# Patient Record
Sex: MALE | Race: WHITE | NOT HISPANIC OR LATINO | Employment: OTHER | URBAN - NONMETROPOLITAN AREA
[De-identification: names, ages, dates, MRNs, and addresses within clinical notes are randomized per-mention and may not be internally consistent; named-entity substitution may affect disease eponyms.]

---

## 2017-04-14 ENCOUNTER — OFFICE VISIT (OUTPATIENT)
Dept: CARDIOLOGY | Facility: CLINIC | Age: 42
End: 2017-04-14

## 2017-04-14 VITALS
BODY MASS INDEX: 33.04 KG/M2 | SYSTOLIC BLOOD PRESSURE: 140 MMHG | DIASTOLIC BLOOD PRESSURE: 93 MMHG | WEIGHT: 205.6 LBS | OXYGEN SATURATION: 99 % | HEIGHT: 66 IN | HEART RATE: 65 BPM

## 2017-04-14 DIAGNOSIS — E78.5 HYPERLIPIDEMIA, UNSPECIFIED HYPERLIPIDEMIA TYPE: ICD-10-CM

## 2017-04-14 DIAGNOSIS — R07.89 OTHER CHEST PAIN: ICD-10-CM

## 2017-04-14 DIAGNOSIS — I42.9 CARDIOMYOPATHY (HCC): ICD-10-CM

## 2017-04-14 DIAGNOSIS — I10 ESSENTIAL HYPERTENSION: ICD-10-CM

## 2017-04-14 DIAGNOSIS — R07.2 PRECORDIAL PAIN: ICD-10-CM

## 2017-04-14 DIAGNOSIS — I25.119 CORONARY ARTERY DISEASE INVOLVING NATIVE CORONARY ARTERY OF NATIVE HEART WITH ANGINA PECTORIS (HCC): Primary | ICD-10-CM

## 2017-04-14 DIAGNOSIS — R06.02 SHORTNESS OF BREATH: ICD-10-CM

## 2017-04-14 PROBLEM — R07.9 CHEST PAIN: Status: ACTIVE | Noted: 2017-04-14

## 2017-04-14 PROCEDURE — 93000 ELECTROCARDIOGRAM COMPLETE: CPT | Performed by: NURSE PRACTITIONER

## 2017-04-14 PROCEDURE — 99214 OFFICE O/P EST MOD 30 MIN: CPT | Performed by: NURSE PRACTITIONER

## 2017-04-14 RX ORDER — NITROGLYCERIN 0.4 MG/1
TABLET SUBLINGUAL
Qty: 30 TABLET | Refills: 3 | Status: SHIPPED | OUTPATIENT
Start: 2017-04-14

## 2017-04-14 RX ORDER — LISINOPRIL 20 MG/1
20 TABLET ORAL DAILY
COMMUNITY
Start: 2017-04-04 | End: 2019-08-27

## 2017-04-14 NOTE — PATIENT INSTRUCTIONS
"You Can Quit Smoking  If you are ready to quit smoking or are thinking about it, congratulations! You have chosen to help yourself be healthier and live longer! There are lots of different ways to quit smoking. Nicotine gum, nicotine patches, a nicotine inhaler, or nicotine nasal spray can help with physical craving. Hypnosis, support groups, and medicines help break the habit of smoking.  TIPS TO GET OFF AND STAY OFF CIGARETTES  · Learn to predict your moods. Do not let a bad situation be your excuse to have a cigarette. Some situations in your life might tempt you to have a cigarette.  · Ask friends and co-workers not to smoke around you.  · Make your home smoke-free.  · Never have \"just one\" cigarette. It leads to wanting another and another. Remind yourself of your decision to quit.  · On a card, make a list of your reasons for not smoking. Read it at least the same number of times a day as you have a cigarette. Tell yourself everyday, \"I do not want to smoke. I choose not to smoke.\"  · Ask someone at home or work to help you with your plan to quit smoking.  · Have something planned after you eat or have a cup of coffee. Take a walk or get other exercise to perk you up. This will help to keep you from overeating.  · Try a relaxation exercise to calm you down and decrease your stress. Remember, you may be tense and nervous the first two weeks after you quit. This will pass.  · Find new activities to keep your hands busy. Play with a pen, coin, or rubber band. Doodle or draw things on paper.  · Brush your teeth right after eating. This will help cut down the craving for the taste of tobacco after meals. You can try mouthwash too.  · Try gum, breath mints, or diet candy to keep something in your mouth.  IF YOU SMOKE AND WANT TO QUIT:  · Do not stock up on cigarettes. Never buy a carton. Wait until one pack is finished before you buy another.  · Never carry cigarettes with you at work or at home.  · Keep cigarettes " "as far away from you as possible. Leave them with someone else.  · Never carry matches or a lighter with you.  · Ask yourself, \"Do I need this cigarette or is this just a reflex?\"  · Bet with someone that you can quit. Put cigarette money in a [employer] bank every morning. If you smoke, you give up the money. If you do not smoke, by the end of the week, you keep the money.  · Keep trying. It takes 21 days to change a habit!  · Talk to your doctor about using medicines to help you quit. These include nicotine replacement gum, lozenges, or skin patches.     This information is not intended to replace advice given to you by your health care provider. Make sure you discuss any questions you have with your health care provider.     Document Released: 10/14/2010 Document Revised: 03/11/2013 Document Reviewed: 05/03/2016  [employer] Interactive Patient Education ©2016 [employer]  Nonspecific Chest Pain   Chest pain can be caused by many different conditions. There is always a chance that your pain could be related to something serious, such as a heart attack or a blood clot in your lungs. Chest pain can also be caused by conditions that are not life-threatening. If you have chest pain, it is very important to follow up with your health care provider.  CAUSES   Chest pain can be caused by:  · Heartburn.  · Pneumonia or bronchitis.  · Anxiety or stress.  · Inflammation around your heart (pericarditis) or lung (pleuritis or pleurisy).  · A blood clot in your lung.  · A collapsed lung (pneumothorax). It can develop suddenly on its own (spontaneous pneumothorax) or from trauma to the chest.  · Shingles infection (varicella-zoster virus).  · Heart attack.  · Damage to the bones, muscles, and cartilage that make up your chest wall. This can include:    Bruised bones due to injury.    Strained muscles or cartilage due to frequent or repeated coughing or overwork.    Fracture to one or more ribs.    Sore cartilage due to inflammation " (costochondritis).  RISK FACTORS   Risk factors for chest pain may include:  · Activities that increase your risk for trauma or injury to your chest.  · Respiratory infections or conditions that cause frequent coughing.  · Medical conditions or overeating that can cause heartburn.  · Heart disease or family history of heart disease.  · Conditions or health behaviors that increase your risk of developing a blood clot.  · Having had chicken pox (varicella zoster).  SIGNS AND SYMPTOMS  Chest pain can feel like:  · Burning or tingling on the surface of your chest or deep in your chest.  · Crushing, pressure, aching, or squeezing pain.  · Dull or sharp pain that is worse when you move, cough, or take a deep breath.  · Pain that is also felt in your back, neck, shoulder, or arm, or pain that spreads to any of these areas.  Your chest pain may come and go, or it may stay constant.  DIAGNOSIS  Lab tests or other studies may be needed to find the cause of your pain. Your health care provider may have you take a test called an ambulatory ECG (electrocardiogram). An ECG records your heartbeat patterns at the time the test is performed. You may also have other tests, such as:  · Transthoracic echocardiogram (TTE). During echocardiography, sound waves are used to create a picture of all of the heart structures and to look at how blood flows through your heart.  · Transesophageal echocardiogram (CINDY). This is a more advanced imaging test that obtains images from inside your body. It allows your health care provider to see your heart in finer detail.  · Cardiac monitoring. This allows your health care provider to monitor your heart rate and rhythm in real time.  · Holter monitor. This is a portable device that records your heartbeat and can help to diagnose abnormal heartbeats. It allows your health care provider to track your heart activity for several days, if needed.  · Stress tests. These can be done through exercise or by  taking medicine that makes your heart beat more quickly.  · Blood tests.  · Imaging tests.  TREATMENT   Your treatment depends on what is causing your chest pain. Treatment may include:  · Medicines. These may include:    Acid blockers for heartburn.    Anti-inflammatory medicine.    Pain medicine for inflammatory conditions.    Antibiotic medicine, if an infection is present.    Medicines to dissolve blood clots.    Medicines to treat coronary artery disease.  · Supportive care for conditions that do not require medicines. This may include:    Resting.    Applying heat or cold packs to injured areas.    Limiting activities until pain decreases.  HOME CARE INSTRUCTIONS  · If you were prescribed an antibiotic medicine, finish it all even if you start to feel better.  · Avoid any activities that bring on chest pain.  · Do not use any tobacco products, including cigarettes, chewing tobacco, or electronic cigarettes. If you need help quitting, ask your health care provider.  · Do not drink alcohol.  · Take medicines only as directed by your health care provider.  · Keep all follow-up visits as directed by your health care provider. This is important. This includes any further testing if your chest pain does not go away.  · If heartburn is the cause for your chest pain, you may be told to keep your head raised (elevated) while sleeping. This reduces the chance that acid will go from your stomach into your esophagus.  · Make lifestyle changes as directed by your health care provider. These may include:    Getting regular exercise. Ask your health care provider to suggest some activities that are safe for you.    Eating a heart-healthy diet. A registered dietitian can help you to learn healthy eating options.    Maintaining a healthy weight.    Managing diabetes, if necessary.    Reducing stress.  SEEK MEDICAL CARE IF:  · Your chest pain does not go away after treatment.  · You have a rash with blisters on your  chest.  · You have a fever.  SEEK IMMEDIATE MEDICAL CARE IF:   · Your chest pain is worse.  · You have an increasing cough, or you cough up blood.  · You have severe abdominal pain.  · You have severe weakness.  · You faint.  · You have chills.  · You have sudden, unexplained chest discomfort.  · You have sudden, unexplained discomfort in your arms, back, neck, or jaw.  · You have shortness of breath at any time.  · You suddenly start to sweat, or your skin gets clammy.  · You feel nauseous or you vomit.  · You suddenly feel light-headed or dizzy.  · Your heart begins to beat quickly, or it feels like it is skipping beats.  These symptoms may represent a serious problem that is an emergency. Do not wait to see if the symptoms will go away. Get medical help right away. Call your local emergency services (911 in the [place] Do not drive yourself to the hospital.     This information is not intended to replace advice given to you by your health care provider. Make sure you discuss any questions you have with your health care provider.     Document Released: 09/27/2006 Document Revised: 01/08/2016 Document Reviewed: 07/24/2015  [employer] Interactive Patient Education ©2016 [employer]

## 2017-04-14 NOTE — PROGRESS NOTES
Subjective   Nitin Solis is a 41 y.o. male     Chief Complaint   Patient presents with   • Follow-up     6 month cath f/u    • Chest Pain   • Shortness of Breath       HPI    Problem List:    1. CAD  1.1 Stress Test 6/28/16 - low risk  1.2 LHC 10/19/16 - obtuse 70%; first septal  60-80%; first diag 50-70%; EF 35%; Stent to LAD  2. Shortness of breath  2.1 Echo 6/28/16 - mild LVH; EF 45-50%  3. Palpitations  3.1 Event Monitro 11/4-11/10/16 -NSR  4. Dizziness   5. Hypothyroidism   6. Smokeless tobacco and smoking habituation  7. Cardiomyopathy   7.1 MUGA 11/2/16 - EF 44%    Patient is a 41-year-old male who presents today for a follow-up with his daughter at his side. He says he has midsternum/across his chest pressure.  He will get short of breath, nauseated and lightheaded.  He says it goes in between his shoulder blades as well.  He does not take nitro.  He says the Ranexa did not help and he did not tolerate Imdur.  He will have fluttering at times, but not too often.  He denies any dizziness, presyncope, syncope or PND.  He will wake up at times by snoring or trying to get his breath.  He says he very seldomly has BLE edema.  He says he is short of breath all of the time.  He says he has never really felt better.  He still smokes 1 PPD.  He never had his ischemia work-up previously.      Current Outpatient Prescriptions   Medication Sig Dispense Refill   • aspirin 81 MG tablet Take 325 mg by mouth Daily. 30 tablet 11   • atorvastatin (LIPITOR) 40 MG tablet Take 1 tablet by mouth daily. 30 tablet 11   • carvedilol (COREG) 6.25 MG tablet Take 1 tablet by mouth 2 (Two) Times a Day. 180 tablet 3   • clopidogrel (PLAVIX) 75 MG tablet Take 1 tablet by mouth daily. 30 tablet 11   • levothyroxine (SYNTHROID, LEVOTHROID) 25 MCG tablet Take 25 mcg by mouth.     • lisinopril (PRINIVIL,ZESTRIL) 20 MG tablet Daily.     • nitroglycerin (NITROSTAT) 0.4 MG SL tablet 1 under the tongue as needed for angina, may  repeat q5mins for up three doses 30 tablet 3     No current facility-administered medications for this visit.        ALLERGIES    Review of patient's allergies indicates no known allergies.    Past Medical History:   Diagnosis Date   • Gallbladder abscess    • Hypertension    • Hypothyroidism        Social History     Social History   • Marital status:      Spouse name: N/A   • Number of children: N/A   • Years of education: N/A     Occupational History   • Contractor      Social History Main Topics   • Smoking status: Current Every Day Smoker     Packs/day: 0.50     Types: Cigarettes     Start date: 5/10/1991   • Smokeless tobacco: Current User     Types: Chew   • Alcohol use No   • Drug use: No   • Sexual activity: Defer     Other Topics Concern   • Not on file     Social History Narrative       Family History   Problem Relation Age of Onset   • Heart disease Mother    • Valvular heart disease Mother    • Heart attack Father    • Heart failure Paternal Uncle        Review of Systems   Constitutional: Positive for diaphoresis and fatigue. Negative for chills and fever.   HENT: Positive for congestion, rhinorrhea and sneezing.    Eyes: Negative.  Negative for visual disturbance.   Respiratory: Positive for chest tightness and shortness of breath (with CP; all of the time also ). Negative for cough and wheezing.    Cardiovascular: Positive for chest pain (occurs with emotional stress), palpitations (fluttering) and leg swelling (not very often ).   Gastrointestinal: Positive for nausea (with CP). Negative for abdominal pain, blood in stool (no melena, no hematuria, no hematemesis, no hematochezia ), constipation, diarrhea and vomiting.   Endocrine: Negative.  Negative for cold intolerance and heat intolerance.   Genitourinary: Negative for difficulty urinating and frequency.   Musculoskeletal: Positive for arthralgias, back pain (between shoulder blades with CP ), myalgias and neck pain.   Skin: Negative.   "  Allergic/Immunologic: Positive for environmental allergies.   Neurological: Positive for light-headedness (with CP ). Negative for dizziness, tremors, seizures, syncope, facial asymmetry, speech difficulty, weakness, numbness and headaches.   Hematological: Negative.    Psychiatric/Behavioral: Negative.        Objective   /93 (BP Location: Left arm, Patient Position: Sitting)  Pulse 65  Ht 66\" (167.6 cm)  Wt 205 lb 9.6 oz (93.3 kg)  SpO2 99%  BMI 33.18 kg/m2  Lab Results (most recent)     None        Physical Exam   Constitutional: He is oriented to person, place, and time. Vital signs are normal. He appears well-developed and well-nourished. He is active and cooperative.   HENT:   Head: Normocephalic.   Mouth/Throat: Abnormal dentition (missing teeth ).   Eyes: Lids are normal.   Neck: Normal carotid pulses, no hepatojugular reflux and no JVD present. Carotid bruit is not present.   Cardiovascular: Normal rate and regular rhythm.    Murmur heard.   Systolic (LUSB) murmur is present with a grade of 1/6   Pulses:       Radial pulses are 2+ on the right side, and 2+ on the left side.        Dorsalis pedis pulses are 2+ on the right side, and 2+ on the left side.        Posterior tibial pulses are 2+ on the right side, and 2+ on the left side.   No edema BLE.    Pulmonary/Chest: Effort normal and breath sounds normal.   Abdominal: Normal appearance.   Neurological: He is alert and oriented to person, place, and time.   Skin: Skin is warm, dry and intact.   Psychiatric: His speech is normal and behavior is normal. Judgment and thought content normal. Cognition and memory are normal.   Flat affect          ECG 12 Lead  Date/Time: 4/14/2017 9:40 AM  Performed by: QUIANA JAIME  Authorized by: QUIANA JAIME   Comparison: compared with previous ECG from 10/19/2016  Similar to previous ECG  Rhythm: sinus rhythm  Rate: normal  BPM: 60  T wave flattening noted on lead: nonspecific t wave changes   QRS axis: " normal  Clinical impression: non-specific ECG          Assessment/Plan      Diagnosis Plan   1. Coronary artery disease involving native coronary artery of native heart with angina pectoris  ECG 12 Lead    Stress Test With Myocardial Perfusion One Day    Adult Transthoracic Echo Complete    Stress Test With Myocardial Perfusion One Day    Adult Transthoracic Echo Complete   2. Shortness of breath  ECG 12 Lead    Stress Test With Myocardial Perfusion One Day    Adult Transthoracic Echo Complete    Pulse Oximetry Device    Stress Test With Myocardial Perfusion One Day    Adult Transthoracic Echo Complete   3. Other chest pain  ECG 12 Lead    Stress Test With Myocardial Perfusion One Day    Adult Transthoracic Echo Complete    Stress Test With Myocardial Perfusion One Day    Adult Transthoracic Echo Complete   4. Hyperlipidemia, unspecified hyperlipidemia type  Stress Test With Myocardial Perfusion One Day    Adult Transthoracic Echo Complete    Stress Test With Myocardial Perfusion One Day    Adult Transthoracic Echo Complete   5. Essential hypertension  Stress Test With Myocardial Perfusion One Day    Adult Transthoracic Echo Complete    Stress Test With Myocardial Perfusion One Day    Adult Transthoracic Echo Complete   6. Precordial pain  nitroglycerin (NITROSTAT) 0.4 MG SL tablet    Stress Test With Myocardial Perfusion One Day    Adult Transthoracic Echo Complete    Stress Test With Myocardial Perfusion One Day    Adult Transthoracic Echo Complete   7. Cardiomyopathy         Return for After testing.         Patient will have an ischemia work-up, stress and echo.  He will continue his medication regimen.  He will use Nitro PRN for chest pain, no resolution he will go to the ER.  He did not tolerate Imdur and Ranexa did not help his symptoms.  He will wear an overnight pulse ox.  He will follow-up after testing or sooner if any changes.  He was encouraged on smoking cessation.

## 2017-04-24 ENCOUNTER — APPOINTMENT (OUTPATIENT)
Dept: CARDIOLOGY | Facility: HOSPITAL | Age: 42
End: 2017-04-24

## 2017-04-24 ENCOUNTER — HOSPITAL ENCOUNTER (OUTPATIENT)
Dept: CARDIOLOGY | Facility: HOSPITAL | Age: 42
Discharge: HOME | End: 2017-04-24

## 2017-04-24 ENCOUNTER — OUTSIDE FACILITY SERVICE (OUTPATIENT)
Dept: CARDIOLOGY | Facility: CLINIC | Age: 42
End: 2017-04-24

## 2017-04-24 ENCOUNTER — HOSPITAL ENCOUNTER (OUTPATIENT)
Dept: CARDIOLOGY | Facility: HOSPITAL | Age: 42
Discharge: HOME | End: 2017-04-24
Admitting: NURSE PRACTITIONER

## 2017-04-24 PROCEDURE — 93306 TTE W/DOPPLER COMPLETE: CPT

## 2017-04-24 PROCEDURE — 93306 TTE W/DOPPLER COMPLETE: CPT | Performed by: INTERNAL MEDICINE

## 2017-05-17 ENCOUNTER — TELEPHONE (OUTPATIENT)
Dept: CARDIOLOGY | Facility: CLINIC | Age: 42
End: 2017-05-17

## 2017-05-18 ENCOUNTER — TELEPHONE (OUTPATIENT)
Dept: CARDIOLOGY | Facility: CLINIC | Age: 42
End: 2017-05-18

## 2017-05-18 DIAGNOSIS — R79.81 ABNORMAL PULSE OXIMETRY: Primary | ICD-10-CM

## 2017-05-18 DIAGNOSIS — R06.02 SHORTNESS OF BREATH: ICD-10-CM

## 2017-07-24 ENCOUNTER — OFFICE VISIT (OUTPATIENT)
Dept: CARDIOLOGY | Facility: CLINIC | Age: 42
End: 2017-07-24

## 2017-07-24 VITALS
HEART RATE: 73 BPM | WEIGHT: 209.4 LBS | SYSTOLIC BLOOD PRESSURE: 114 MMHG | OXYGEN SATURATION: 97 % | BODY MASS INDEX: 33.65 KG/M2 | DIASTOLIC BLOOD PRESSURE: 71 MMHG | HEIGHT: 66 IN

## 2017-07-24 DIAGNOSIS — I10 ESSENTIAL HYPERTENSION: ICD-10-CM

## 2017-07-24 DIAGNOSIS — R06.02 SHORTNESS OF BREATH: ICD-10-CM

## 2017-07-24 DIAGNOSIS — I25.119 CORONARY ARTERY DISEASE INVOLVING NATIVE CORONARY ARTERY OF NATIVE HEART WITH ANGINA PECTORIS (HCC): Primary | ICD-10-CM

## 2017-07-24 DIAGNOSIS — F17.200 SMOKING: ICD-10-CM

## 2017-07-24 DIAGNOSIS — E78.5 HYPERLIPIDEMIA, UNSPECIFIED HYPERLIPIDEMIA TYPE: ICD-10-CM

## 2017-07-24 DIAGNOSIS — Z72.0 SMOKELESS TOBACCO USE: ICD-10-CM

## 2017-07-24 PROCEDURE — 99213 OFFICE O/P EST LOW 20 MIN: CPT | Performed by: NURSE PRACTITIONER

## 2017-07-24 NOTE — PROGRESS NOTES
Subjective   Nitin Solis is a 41 y.o. male     Chief Complaint   Patient presents with   • Follow-up     presents as a follow up       HPI    Problem List:    1. CAD  1.1 Stress Test 6/28/16 - low risk  1.2 LHC 10/19/16 - obtuse 70%; first septal  60-80%; first diag 50-70%; EF 35%; Stent to LAD  2. Shortness of breath  2.1 Echo 6/28/16 - mild LVH; EF 45-50%  2.2 Echo 4/24/17-mild LVH, EF 50-55%, diastolic dysfunction 1, trace MR, trace TR  3. Palpitations  3.1 Event Monitro 11/4-11/10/16 -NSR  4. Dizziness   5. Hypothyroidism   6. Smokeless tobacco and smoking habituation  7. Cardiomyopathy   7.1 MUGA 11/2/16 - EF 44%    Patient is a 41-year-old male who presents today for follow-up.  He denies any chest pain, pressure, palpitations, fluttering, dizziness, presyncope, 6B, orthopnea, PND or edema.  He also denies any shortness of breath.  He goes and sees Dr. Smith today for follow-up status post sleep study and PFT.  He is still smoking half pack a day and uses a half can of the per day.  Patient asked what medications he can.  Advise not this time as of the end of October he will be up to come off Plavix.  PCP monitors his cholesterol.    Current Outpatient Prescriptions   Medication Sig Dispense Refill   • aspirin 81 MG tablet Take 325 mg by mouth Daily. 30 tablet 11   • atorvastatin (LIPITOR) 40 MG tablet Take 1 tablet by mouth daily. 30 tablet 11   • carvedilol (COREG) 6.25 MG tablet Take 1 tablet by mouth 2 (Two) Times a Day. 180 tablet 3   • clopidogrel (PLAVIX) 75 MG tablet Take 1 tablet by mouth daily. 30 tablet 11   • levothyroxine (SYNTHROID, LEVOTHROID) 25 MCG tablet Take 25 mcg by mouth.     • lisinopril (PRINIVIL,ZESTRIL) 20 MG tablet Daily.     • nitroglycerin (NITROSTAT) 0.4 MG SL tablet 1 under the tongue as needed for angina, may repeat q5mins for up three doses 30 tablet 3     No current facility-administered medications for this visit.        ALLERGIES    Review of patient's  "allergies indicates no known allergies.    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   HENT: Negative for rhinorrhea and sneezing.    Eyes: Negative for visual disturbance.   Respiratory: Negative for chest tightness and shortness of breath.    Cardiovascular: Negative for chest pain, palpitations and leg swelling.   Gastrointestinal: Negative for nausea and vomiting.   Endocrine: Negative.    Genitourinary: Negative for difficulty urinating.   Musculoskeletal: Negative for arthralgias, back pain and neck pain.   Skin: Negative.    Allergic/Immunologic: Negative.  Negative for environmental allergies.   Neurological: Negative for dizziness, syncope and light-headedness.   Hematological: Negative.    Psychiatric/Behavioral: Negative.        Objective   /71 (BP Location: Left arm, Patient Position: Sitting)  Pulse 73  Ht 66\" (167.6 cm)  Wt 209 lb 6.4 oz (95 kg)  SpO2 97%  BMI 33.8 kg/m2  Lab Results (most recent)     None        Physical Exam   Constitutional: He is oriented to person, place, and time. Vital signs are normal. He appears well-developed and well-nourished. He is active and " cooperative.   HENT:   Head: Normocephalic.   Eyes: Lids are normal.   Neck: Normal carotid pulses, no hepatojugular reflux and no JVD present. Carotid bruit is not present.   Cardiovascular: Normal rate, regular rhythm and normal heart sounds.    Pulses:       Radial pulses are 2+ on the right side, and 2+ on the left side.        Dorsalis pedis pulses are 2+ on the left side.        Posterior tibial pulses are 2+ on the left side.   No edema BLE; unable to assess right due to fx with boot   Pulmonary/Chest: Effort normal and breath sounds normal.   Abdominal: Normal appearance and bowel sounds are normal.   Musculoskeletal:   fx right foot with boot    Neurological: He is alert and oriented to person, place, and time.   Skin: Skin is warm, dry and intact.   Psychiatric: His speech is normal and behavior is normal. Judgment and thought content normal. Cognition and memory are normal.   Flat        Procedure   Procedures         Assessment/Plan      Diagnosis Plan   1. Coronary artery disease involving native coronary artery of native heart with angina pectoris     2. Essential hypertension     3. Hyperlipidemia, unspecified hyperlipidemia type     4. Shortness of breath     5. Smoking     6. Smokeless tobacco use         Return in about 6 months (around 1/24/2018).       CAD-patient is on aspirin, statin and beta.  Hypertension-patient doing well.  Dyslipidemia-patient is on Lipitor and monitor by PCP.  Shortness of breath-patient says this is improved.  Smoking-patient was encouraged on smoking cessation as well as vocalist tobacco cessation.  Patient will follow-up in 6 months or sooner if any changes.  Patient refused Lexiscan however no symptoms at this time.

## 2017-07-24 NOTE — PATIENT INSTRUCTIONS
Coronary Artery Disease, Male  Coronary artery disease (CAD) is a process in which the blood vessels of the heart (coronary arteries) become narrow or blocked. The narrowing or blockage can lead to decreased blood flow to the heart muscle (angina). Prolonged reduced blood flow can cause a heart attack (myocardial infarction, MI). Because CAD is the leading cause of death in men, it is important to understand what causes this condition and how it is treated.  CAUSES  Atherosclerosis is the cause of CAD. This is the buildup of fat and cholesterol (plaque) on the inside of the arteries. Over time, the plaque may narrow or block the artery, and this will lessen blood flow to the heart. Plaque can also become weak and break off within a coronary artery to form a clot and cause a sudden blockage.  RISK FACTORS  Many risk factors increase your chances of getting CAD, including:  · High cholesterol levels.  · High blood pressure (hypertension).  · Tobacco use.  · Diabetes.  · Age. Men over age 45 are at a greater risk of CAD.  · Gender. Men often develop CAD earlier in life than women.  · Family history of CAD.  · Obesity.  · Lack of exercise.  · A diet high in saturated fats.  SYMPTOMS   Many people do not experience any symptoms during the early stages of CAD. As the condition progresses, symptoms may include:   · Chest pain.  ¨ The pain can be described as a crushing or squeezing in the chest, or a tightness, pressure, fullness, or heaviness in the chest.  ¨ The pain can last more than a few minutes or can stop and recur.   · Pain in the arms, neck, jaw, or back.  · Unexplained heartburn or indigestion.  · Shortness of breath.  · Nausea.  · Sudden cold sweats.   Less common symptoms of CAD in men can include:  · Fatigue.  · Unexplained feelings of nervousness or anxiety.  · Weakness.  · Diarrhea.  · Sudden light-headedness.  DIAGNOSIS   Tests to diagnose CAD may include:  · ECG (electrocardiogram).  · Exercise stress  test. This looks for signs of blockage when the heart is being exercised.  · Pharmacologic stress test. This test looks for signs of blockage when the heart is being stressed with a medicine.  · Blood tests.  · Coronary angiogram. This is a procedure to look at the coronary arteries to see if there is any blockage.  TREATMENT  The treatment of CAD may include the following:  · Healthy behavioral changes to reduce or control risk factors.  · Medicine.  · Coronary stenting. A stent helps to keep an artery open.  · Coronary angioplasty. This procedure widens a narrowed or blocked artery.  · Coronary artery bypass surgery. This will allow your blood to pass the blockage (bypass) to reach your heart.  HOME CARE INSTRUCTIONS  · Take medicines only as directed by your health care provider.  · Do not take the following medicines unless your health care provider approves:    Nonsteroidal anti-inflammatory drugs (NSAIDs), such as ibuprofen, naproxen, or celecoxib.    Vitamin supplements that contain vitamin A, vitamin E, or both.  · Manage other health conditions such as hypertension and diabetes as directed by your health care provider.  · Follow a heart-healthy diet. A dietitian can help to educate you about healthy food options and changes.  · Use healthy cooking methods such as roasting, grilling, broiling, baking, poaching, steaming, or stir-frying. Talk to a dietitian to learn more about healthy cooking methods.  · Follow an exercise program approved by your health care provider.  · Maintain a healthy weight. Lose weight as approved by your health care provider.  · Plan rest periods when fatigued.  · Learn to manage stress.  · Do not use any tobacco products, including cigarettes, chewing tobacco, or electronic cigarettes. If you need help quitting, ask your health care provider.  · If you drink alcohol, and your health care provider approves, limit your alcohol intake to no more than 1 drink per day. One drink equals  12 ounces of beer, 5 ounces of wine, or 1½ ounces of hard liquor.  · Stop illegal drug use.  · Your health care provider may ask you to monitor your blood pressure. A blood pressure reading consists of a higher number over a lower number, such as 110 over 72, which is written as 110/72. Ideally, your blood pressure should be:    Below 140/90 if you have no other medical conditions.    Below 130/80 if you have diabetes or kidney disease.  · Keep all follow-up visits as directed by your health care provider. This is important.  SEEK IMMEDIATE MEDICAL CARE IF:  · You have pain in your chest, neck, arm, jaw, stomach, or back that lasts more than a few minutes, is recurring, or is unrelieved by taking medicine under your tongue (sublingual nitroglycerin).  · You have profuse sweating without cause.  · You have unexplained:    Heartburn or indigestion.    Shortness of breath or difficulty breathing.    Nausea or vomiting.    Fatigue.    Feelings of nervousness or anxiety.    Weakness.    Diarrhea.  · You have sudden light-headedness or dizziness.  · You faint.  These symptoms may represent a serious problem that is an emergency. Do not wait to see if the symptoms will go away. Get medical help right away. Call your local emergency services (911 in the [place] Do not drive yourself to the hospital.     This information is not intended to replace advice given to you by your health care provider. Make sure you discuss any questions you have with your health care provider.     Document Released: 07/15/2015 Document Reviewed: 07/15/2015  [employer] Interactive Patient Education ©2017 [employer]  What You Need to Know About Smokeless Tobacco Use  Tobacco use is one of the leading causes of cancer and other chronic health problems. Smokeless tobacco is tobacco that is put directly into the mouth instead of being smoked. It may also be called chewing tobacco or snuff. Smokeless tobacco is made from the leaves of tobacco plants  "and it comes in several forms:  · Loose, dry leaves, plugs, or twists.  · Moist pouches.  · Dissolving lozenges or strips.  Chewing, sucking, or holding the tobacco in your mouth causes your mouth to make more saliva. The saliva mixes with the tobacco to make \"tobacco juice\" that is swallowed or spit out.  HOW CAN SMOKELESS TOBACCO AFFECT ME?  Using smokeless tobacco:  · Increases your risk of developing cancer. Smokeless tobacco contains at least 28 different types of cancer-causing chemicals (carcinogens).  · Increases your chances of developing other long-term health problems, including high blood pressure, heart disease, stroke, and dental problems.  · Can make you become addicted. Nicotine is one of the chemicals in tobacco. When you chew tobacco, you absorb nicotine from the tobacco juice. This can make you feel more alert than usual.  · Can cause problems with pregnancy. Pregnant women who use smokeless tobacco are more likely to miscarry or deliver a baby too early (premature delivery).  · Can affect the appearance and health of your mouth. Using smokeless tobacco may cause bad breath, yellow-brown teeth, mouth sores, cracking and bleeding lips, gum recession, and lesions on the soft tissues of your mouth (leukoplakia).  WHAT ARE THE BENEFITS OF NOT USING SMOKELESS TOBACCO?  The benefits of not using smokeless tobacco include:  · A healthy mind because:    You avoid addiction.  · A healthy body because:    You avoid dental problems.    You promote healthy pregnancy.    You avoid long-term health problems.  · A healthy wallet because:    You avoid costs of buying tobacco.    You avoid health care costs in the future.  · A healthy family because:    You avoid accidental poisoning of children in your household.  WHAT CAN HAPPEN IF I CONTINUE TO USE SMOKELESS TOBACCO?  If you continue to use smokeless tobacco, you will increase your risk for developing certain cancers. These include:  · Tongue.  · Lips, mouth, " and gums.  · Throat (esophagus) and voice box (larynx).  · Stomach.  · Pancreas.  · Bladder.  · Colon.  Long-term use of smokeless tobacco can also lead to:  · High blood pressure, heart disease, and stroke.  · Gum disease, gum recession, and bone loss around the teeth.  · Tooth decay.  HOW DO I QUIT USING SMOKELESS TOBACCO?  Quitting the use of smokeless tobacco can be hard, but it can be done. Follow these steps:  · Pick a date to quit. Set a date within the next two weeks. This gives you time to prepare.  · Write down the reasons why you are quitting. Keep this list in places where you will see it often, such as on your bathroom mirror or in your car or wallet.  · Identify the people, places, things, and activities that make you want to use tobacco (triggers) and avoid them.  · Get rid of any tobacco you have and remove any tobacco smells. To do this:    Throw away all containers of tobacco at home, at work, and in your car.    Throw away any other items that you use regularly when you chew tobacco.    Clean your car and make sure to remove all tobacco-related items.    Clean your home, including curtains and carpets.  · Tell your family, friends, and coworkers that you are quitting. This can make quitting easier.  · Ask your health care provider for help quitting smokeless tobacco. This may involve treatment. Find out what treatment options are covered by your health insurance.  · Keep track of how many days have passed since you quit. Remembering how long and hard you have worked to quit can help you avoid using tobacco again.  WHERE CAN I GET SUPPORT?  Ask your health care provider if there is a local support group for quitting smokeless tobacco.  WHERE CAN I GET MORE INFORMATION?  You can learn more about the risks of using smokeless tobacco and the benefits of quitting from these sources:  · National Cancer [place]: www.cancer.gov  · American Cancer Society: www.cancer.org  WHEN SHOULD I SEEK MEDICAL  "CARE?  Seek medical care if you have:  · White or other discolored patches in your mouth.  · Difficulty swallowing.  · A change in your voice.  · Unexplained weight loss.  · Stomach pain, nausea, or vomiting.  REMEMBER:  · Smokeless tobacco contains at least 28 different chemicals that are known to cause cancer (carcinogen).  · Nicotine is an addictive chemical in smokeless tobacco.  · When you quit using smokeless tobacco, you lower your risk of developing cancer.     This information is not intended to replace advice given to you by your health care provider. Make sure you discuss any questions you have with your health care provider.     Document Released: 05/21/2012 Document Revised: 04/10/2017 Document Reviewed: 07/29/2016  [employer] Interactive Patient Education ©2017 [employer]  You Can Quit Smoking  If you are ready to quit smoking or are thinking about it, congratulations! You have chosen to help yourself be healthier and live longer! There are lots of different ways to quit smoking. Nicotine gum, nicotine patches, a nicotine inhaler, or nicotine nasal spray can help with physical craving. Hypnosis, support groups, and medicines help break the habit of smoking.  TIPS TO GET OFF AND STAY OFF CIGARETTES  · Learn to predict your moods. Do not let a bad situation be your excuse to have a cigarette. Some situations in your life might tempt you to have a cigarette.  · Ask friends and co-workers not to smoke around you.  · Make your home smoke-free.  · Never have \"just one\" cigarette. It leads to wanting another and another. Remind yourself of your decision to quit.  · On a card, make a list of your reasons for not smoking. Read it at least the same number of times a day as you have a cigarette. Tell yourself everyday, \"I do not want to smoke. I choose not to smoke.\"  · Ask someone at home or work to help you with your plan to quit smoking.  · Have something planned after you eat or have a cup of coffee. Take a " "walk or get other exercise to perk you up. This will help to keep you from overeating.  · Try a relaxation exercise to calm you down and decrease your stress. Remember, you may be tense and nervous the first two weeks after you quit. This will pass.  · Find new activities to keep your hands busy. Play with a pen, coin, or rubber band. Doodle or draw things on paper.  · Brush your teeth right after eating. This will help cut down the craving for the taste of tobacco after meals. You can try mouthwash too.  · Try gum, breath mints, or diet candy to keep something in your mouth.  IF YOU SMOKE AND WANT TO QUIT:  · Do not stock up on cigarettes. Never buy a carton. Wait until one pack is finished before you buy another.  · Never carry cigarettes with you at work or at home.  · Keep cigarettes as far away from you as possible. Leave them with someone else.  · Never carry matches or a lighter with you.  · Ask yourself, \"Do I need this cigarette or is this just a reflex?\"  · Bet with someone that you can quit. Put cigarette money in a [employer] bank every morning. If you smoke, you give up the money. If you do not smoke, by the end of the week, you keep the money.  · Keep trying. It takes 21 days to change a habit!  · Talk to your doctor about using medicines to help you quit. These include nicotine replacement gum, lozenges, or skin patches.     This information is not intended to replace advice given to you by your health care provider. Make sure you discuss any questions you have with your health care provider.     Document Released: 10/14/2010 Document Revised: 03/11/2013 Document Reviewed: 05/03/2016  [employer] Interactive Patient Education ©2017 [employer]    "

## 2017-09-13 DIAGNOSIS — R06.83 SNORING: ICD-10-CM

## 2017-09-13 DIAGNOSIS — E78.5 DYSLIPIDEMIA: ICD-10-CM

## 2017-09-13 DIAGNOSIS — R53.83 MALAISE AND FATIGUE: ICD-10-CM

## 2017-09-13 DIAGNOSIS — R06.02 SHORTNESS OF BREATH: ICD-10-CM

## 2017-09-13 DIAGNOSIS — I10 ESSENTIAL HYPERTENSION: ICD-10-CM

## 2017-09-13 DIAGNOSIS — R07.2 PRECORDIAL PAIN: ICD-10-CM

## 2017-09-13 DIAGNOSIS — R53.81 MALAISE AND FATIGUE: ICD-10-CM

## 2017-09-13 RX ORDER — ATORVASTATIN CALCIUM 40 MG/1
TABLET, FILM COATED ORAL
Qty: 30 TABLET | Refills: 5 | Status: SHIPPED | OUTPATIENT
Start: 2017-09-13

## 2017-09-13 RX ORDER — CLOPIDOGREL BISULFATE 75 MG/1
TABLET ORAL
Qty: 30 TABLET | Refills: 5 | Status: SHIPPED | OUTPATIENT
Start: 2017-09-13

## 2017-12-02 DIAGNOSIS — I42.9 CARDIOMYOPATHY (HCC): ICD-10-CM

## 2017-12-04 RX ORDER — CARVEDILOL 6.25 MG/1
TABLET ORAL
Qty: 180 TABLET | Refills: 5 | Status: SHIPPED | OUTPATIENT
Start: 2017-12-04 | End: 2019-08-06 | Stop reason: SDUPTHER

## 2018-03-27 ENCOUNTER — OFFICE VISIT (OUTPATIENT)
Dept: CARDIOLOGY | Facility: CLINIC | Age: 43
End: 2018-03-27

## 2018-03-27 VITALS
BODY MASS INDEX: 32.75 KG/M2 | DIASTOLIC BLOOD PRESSURE: 87 MMHG | SYSTOLIC BLOOD PRESSURE: 122 MMHG | OXYGEN SATURATION: 97 % | WEIGHT: 203.8 LBS | HEIGHT: 66 IN | HEART RATE: 91 BPM

## 2018-03-27 DIAGNOSIS — F17.200 SMOKING: ICD-10-CM

## 2018-03-27 DIAGNOSIS — R07.89 OTHER CHEST PAIN: ICD-10-CM

## 2018-03-27 DIAGNOSIS — Z72.0 SMOKELESS TOBACCO USE: ICD-10-CM

## 2018-03-27 DIAGNOSIS — E78.5 HYPERLIPIDEMIA, UNSPECIFIED HYPERLIPIDEMIA TYPE: ICD-10-CM

## 2018-03-27 DIAGNOSIS — I10 ESSENTIAL HYPERTENSION: ICD-10-CM

## 2018-03-27 DIAGNOSIS — R06.02 SHORTNESS OF BREATH: ICD-10-CM

## 2018-03-27 DIAGNOSIS — Z00.00 HEALTHCARE MAINTENANCE: ICD-10-CM

## 2018-03-27 DIAGNOSIS — I25.119 CORONARY ARTERY DISEASE INVOLVING NATIVE CORONARY ARTERY OF NATIVE HEART WITH ANGINA PECTORIS (HCC): Primary | ICD-10-CM

## 2018-03-27 PROCEDURE — 93000 ELECTROCARDIOGRAM COMPLETE: CPT | Performed by: NURSE PRACTITIONER

## 2018-03-27 PROCEDURE — 99214 OFFICE O/P EST MOD 30 MIN: CPT | Performed by: NURSE PRACTITIONER

## 2018-03-27 RX ORDER — ISOSORBIDE MONONITRATE 30 MG/1
TABLET, EXTENDED RELEASE ORAL
Qty: 30 TABLET | Refills: 11 | Status: SHIPPED | OUTPATIENT
Start: 2018-03-27 | End: 2019-08-06 | Stop reason: SINTOL

## 2018-03-27 NOTE — PATIENT INSTRUCTIONS
Obesity, Adult  Obesity is the condition of having too much total body fat. Being overweight or obese means that your weight is greater than what is considered healthy for your body size. Obesity is determined by a measurement called BMI. BMI is an estimate of body fat and is calculated from height and weight. For adults, a BMI of 30 or higher is considered obese.  Obesity can eventually lead to other health concerns and major illnesses, including:  · Stroke.  · Coronary artery disease (CAD).  · Type 2 diabetes.  · Some types of cancer, including cancers of the colon, breast, uterus, and gallbladder.  · Osteoarthritis.  · High blood pressure (hypertension).  · High cholesterol.  · Sleep apnea.  · Gallbladder stones.  · Infertility problems.  What are the causes?  The main cause of obesity is taking in (consuming) more calories than your body uses for energy. Other factors that contribute to this condition may include:  · Being born with genes that make you more likely to become obese.  · Having a medical condition that causes obesity. These conditions include:  ¨ Hypothyroidism.  ¨ Polycystic ovarian syndrome (PCOS).  ¨ Binge-eating disorder.  ¨ Cushing syndrome.  · Taking certain medicines, such as steroids, antidepressants, and seizure medicines.  · Not being physically active (sedentary lifestyle).  · Living where there are limited places to exercise safely or buy healthy foods.  · Not getting enough sleep.  What increases the risk?  The following factors may increase your risk of this condition:  · Having a family history of obesity.  · Being a woman of -American descent.  · Being a man of  descent.  What are the signs or symptoms?  Having excessive body fat is the main symptom of this condition.  How is this diagnosed?  This condition may be diagnosed based on:  · Your symptoms.  · Your medical history.  · A physical exam. Your health care provider may measure:  ¨ Your BMI. If you are an adult  with a BMI between 25 and less than 30, you are considered overweight. If you are an adult with a BMI of 30 or higher, you are considered obese.  ¨ The distances around your hips and your waist (circumferences). These may be compared to each other to help diagnose your condition.  ¨ Your skinfold thickness. Your health care provider may gently pinch a fold of your skin and measure it.  How is this treated?  Treatment for this condition often includes changing your lifestyle. Treatment may include some or all of the following:  · Dietary changes. Work with your health care provider and a dietitian to set a weight-loss goal that is healthy and reasonable for you. Dietary changes may include eating:  ¨ Smaller portions. A portion size is the amount of a particular food that is healthy for you to eat at one time. This varies from person to person.  ¨ Low-calorie or low-fat options.  ¨ More whole grains, fruits, and vegetables.  · Regular physical activity. This may include aerobic activity (cardio) and strength training.  · Medicine to help you lose weight. Your health care provider may prescribe medicine if you are unable to lose 1 pound a week after 6 weeks of eating more healthily and doing more physical activity.  · Surgery. Surgical options may include gastric banding and gastric bypass. Surgery may be done if:  ¨ Other treatments have not helped to improve your condition.  ¨ You have a BMI of 40 or higher.  ¨ You have life-threatening health problems related to obesity.  Follow these instructions at home:     Eating and drinking     · Follow recommendations from your health care provider about what you eat and drink. Your health care provider may advise you to:  ¨ Limit fast foods, sweets, and processed snack foods.  ¨ Choose low-fat options, such as low-fat milk instead of whole milk.  ¨ Eat 5 or more servings of fruits or vegetables every day.  ¨ Eat at home more often. This gives you more control over what you  eat.  ¨ Choose healthy foods when you eat out.  ¨ Learn what a healthy portion size is.  ¨ Keep low-fat snacks on hand.  ¨ Avoid sugary drinks, such as soda, fruit juice, iced tea sweetened with sugar, and flavored milk.  ¨ Eat a healthy breakfast.  · Drink enough water to keep your urine clear or pale yellow.  · Do not go without eating for long periods of time (do not fast) or follow a fad diet. Fasting and fad diets can be unhealthy and even dangerous.  Physical Activity   · Exercise regularly, as told by your health care provider. Ask your health care provider what types of exercise are safe for you and how often you should exercise.  · Warm up and stretch before being active.  · Cool down and stretch after being active.  · Rest between periods of activity.  Lifestyle   · Limit the time that you spend in front of your TV, computer, or video game system.  · Find ways to reward yourself that do not involve food.  · Limit alcohol intake to no more than 1 drink a day for nonpregnant women and 2 drinks a day for men. One drink equals 12 oz of beer, 5 oz of wine, or 1½ oz of hard liquor.  General instructions   · Keep a weight loss journal to keep track of the food you eat and how much you exercise you get.  · Take over-the-counter and prescription medicines only as told by your health care provider.  · Take vitamins and supplements only as told by your health care provider.  · Consider joining a support group. Your health care provider may be able to recommend a support group.  · Keep all follow-up visits as told by your health care provider. This is important.  Contact a health care provider if:  · You are unable to meet your weight loss goal after 6 weeks of dietary and lifestyle changes.  This information is not intended to replace advice given to you by your health care provider. Make sure you discuss any questions you have with your health care provider.  Document Released: 01/25/2006 Document Revised:  05/22/2017 Document Reviewed: 10/05/2016  [employer] Interactive Patient Education © 2017 [employer]  MyPlate from [employer]  The general, healthful diet is based on the 2010 Dietary Guidelines for Americans. The amount of food you need to eat from each food group depends on your age, sex, and level of physical activity and can be individualized by a dietitian. Go to ChooseMyPlate.gov for more information.  What do I need to know about the MyPlate plan?  · Enjoy your food, but eat less.  · Avoid oversized portions.  ¨ ½ of your plate should include fruits and vegetables.  ¨ ¼ of your plate should be grains.  ¨ ¼ of your plate should be protein.  Grains   · Make at least half of your grains whole grains.  · For a 2,000 calorie daily food plan, eat 6 oz every day.  · 1 oz is about 1 slice bread, 1 cup cereal, or ½ cup cooked rice, cereal, or pasta.  Vegetables   · Make half your plate fruits and vegetables.  · For a 2,000 calorie daily food plan, eat 2½ cups every day.  · 1 cup is about 1 cup raw or cooked vegetables or vegetable juice or 2 cups raw leafy greens.  Fruits   · Make half your plate fruits and vegetables.  · For a 2,000 calorie daily food plan, eat 2 cups every day.  · 1 cup is about 1 cup fruit or 100% fruit juice or ½ cup dried fruit.  Protein   · For a 2,000 calorie daily food plan, eat 5½ oz every day.  · 1 oz is about 1 oz meat, poultry, or fish, ¼ cup cooked beans, 1 egg, 1 Tbsp peanut butter, or ½ oz nuts or seeds.  Dairy   · Switch to fat-free or low-fat (1%) milk.  · For a 2,000 calorie daily food plan, eat 3 cups every day.  · 1 cup is about 1 cup milk or yogurt or soy milk (soy beverage), 1½ oz natural cheese, or 2 oz processed cheese.  Fats, Oils, and Empty Calories   · Only small amounts of oils are recommended.  · Empty calories are calories from solid fats or added sugars.  · Compare sodium in foods like soup, bread, and frozen meals. Choose the foods with lower numbers.  · Drink water instead  of sugary drinks.  What foods can I eat?  Grains   Whole grains such as whole wheat, quinoa, millet, and bulgur. Bread, rolls, and pasta made from whole grains. Brown or wild rice. Hot or cold cereals made from whole grains and without added sugar.  Vegetables   All fresh vegetables, especially fresh red, dark green, or orange vegetables. Peas and beans. Low-sodium frozen or canned vegetables prepared without added salt. Low-sodium vegetable juices.  Fruits   All fresh, frozen, and dried fruits. Canned fruit packed in water or fruit juice without added sugar. Fruit juices without added sugar.  Meats and Other Protein Sources   Boiled, baked, or grilled lean meat trimmed of fat. Skinless poultry. Fresh seafood and shellfish. Canned seafood packed in water. Unsalted nuts and unsalted nut butters. Tofu. Dried beans and pea. Eggs.  Dairy   Low-fat or fat-free milk, yogurt, and cheeses.  Sweets and Desserts   Frozen desserts made from low-fat milk.  Fats and Oils   Olive, peanut, and canola oils and margarine. Salad dressing and mayonnaise made from these oils.  Other   Soups and casseroles made from allowed ingredients and without added fat or salt.  The items listed above may not be a complete list of recommended foods or beverages. Contact your dietitian for more options.   What foods are not recommended?  Grains   Sweetened, low-fiber cereals. Packaged baked goods. Snack crackers and chips. Cheese crackers, butter crackers, and biscuits. Frozen waffles, sweet breads, doughnuts, pastries, packaged baking mixes, pancakes, cakes, and cookies.  Vegetables   Regular canned or frozen vegetables or vegetables prepared with salt. Canned tomatoes. Canned tomato sauce. Fried vegetables. Vegetables in cream sauce or cheese sauce.  Fruits   Fruits packed in syrup or made with added sugar.  Meats and Other Protein Sources   Marbled or fatty meats such as ribs. Poultry with skin. Fried meats, poultry, eggs, or fish. Sausages, hot  dogs, and deli meats such as pastrami, bologna, or salami.  Dairy   Whole milk, cream, cheeses made from whole milk, sour cream. Ice cream or yogurt made from whole milk or with added sugar.  Beverages   For adults, no more than one alcoholic drink per day. Regular soft drinks or other sugary beverages. Juice drinks.  Sweets and Desserts   Sugary or fatty desserts, candy, and other sweets.  Fats and Oils   Solid shortening or partially hydrogenated oils. Solid margarine. Margarine that contains trans fats. Butter.  The items listed above may not be a complete list of foods and beverages to avoid. Contact your dietitian for more information.   This information is not intended to replace advice given to you by your health care provider. Make sure you discuss any questions you have with your health care provider.  Document Released: 01/06/2009 Document Revised: 05/25/2017 Document Reviewed: 11/26/2014  [employer] Interactive Patient Education © 2017 [employer] Inc.  For more information:    Quit Now [place]  1-800-QUIT-NOW  https://[place].quitlogix.org/en-[place]/  Steps to Quit Smoking  Smoking tobacco can be harmful to your health and can affect almost every organ in your body. Smoking puts you, and those around you, at risk for developing many serious chronic diseases. Quitting smoking is difficult, but it is one of the best things that you can do for your health. It is never too late to quit.  What are the benefits of quitting smoking?  When you quit smoking, you lower your risk of developing serious diseases and conditions, such as:  · Lung cancer or lung disease, such as COPD.  · Heart disease.  · Stroke.  · Heart attack.  · Infertility.  · Osteoporosis and bone fractures.  Additionally, symptoms such as coughing, wheezing, and shortness of breath may get better when you quit. You may also find that you get sick less often because your body is stronger at fighting off colds and infections. If you are pregnant, quitting  smoking can help to reduce your chances of having a baby of low birth weight.  How do I get ready to quit?  When you decide to quit smoking, create a plan to make sure that you are successful. Before you quit:  · Pick a date to quit. Set a date within the next two weeks to give you time to prepare.  · Write down the reasons why you are quitting. Keep this list in places where you will see it often, such as on your bathroom mirror or in your car or wallet.  · Identify the people, places, things, and activities that make you want to smoke (triggers) and avoid them. Make sure to take these actions:  ¨ Throw away all cigarettes at home, at work, and in your car.  ¨ Throw away smoking accessories, such as ashtrays and lighters.  ¨ Clean your car and make sure to empty the ashtray.  ¨ Clean your home, including curtains and carpets.  · Tell your family, friends, and coworkers that you are quitting. Support from your loved ones can make quitting easier.  · Talk with your health care provider about your options for quitting smoking.  · Find out what treatment options are covered by your health insurance.  What strategies can I use to quit smoking?  Talk with your healthcare provider about different strategies to quit smoking. Some strategies include:  · Quitting smoking altogether instead of gradually lessening how much you smoke over a period of time. Research shows that quitting “cold turkey” is more successful than gradually quitting.  · Attending in-person counseling to help you build problem-solving skills. You are more likely to have success in quitting if you attend several counseling sessions. Even short sessions of 10 minutes can be effective.  · Finding resources and support systems that can help you to quit smoking and remain smoke-free after you quit. These resources are most helpful when you use them often. They can include:  ¨ Online chats with a counselor.  ¨ Telephone quitlines.  ¨ Printed self-help  materials.  ¨ Support groups or group counseling.  ¨ Text messaging programs.  ¨ Mobile phone applications.  · Taking medicines to help you quit smoking. (If you are pregnant or breastfeeding, talk with your health care provider first.) Some medicines contain nicotine and some do not. Both types of medicines help with cravings, but the medicines that include nicotine help to relieve withdrawal symptoms. Your health care provider may recommend:  ¨ Nicotine patches, gum, or lozenges.  ¨ Nicotine inhalers or sprays.  ¨ Non-nicotine medicine that is taken by mouth.  Talk with your health care provider about combining strategies, such as taking medicines while you are also receiving in-person counseling. Using these two strategies together makes you more likely to succeed in quitting than if you used either strategy on its own.  If you are pregnant or breastfeeding, talk with your health care provider about finding counseling or other support strategies to quit smoking. Do not take medicine to help you quit smoking unless told to do so by your health care provider.  What things can I do to make it easier to quit?  Quitting smoking might feel overwhelming at first, but there is a lot that you can do to make it easier. Take these important actions:  · Reach out to your family and friends and ask that they support and encourage you during this time. Call telephone quitlines, reach out to support groups, or work with a counselor for support.  · Ask people who smoke to avoid smoking around you.  · Avoid places that trigger you to smoke, such as bars, parties, or smoke-break areas at work.  · Spend time around people who do not smoke.  · Lessen stress in your life, because stress can be a smoking trigger for some people. To lessen stress, try:  ¨ Exercising regularly.  ¨ Deep-breathing exercises.  ¨ Yoga.  ¨ Meditating.  ¨ Performing a body scan. This involves closing your eyes, scanning your body from head to toe, and  noticing which parts of your body are particularly tense. Purposefully relax the muscles in those areas.  · Download or purchase mobile phone or tablet apps (applications) that can help you stick to your quit plan by providing reminders, tips, and encouragement. There are many free apps, such as QuitGuide from the CDC (Centers for Disease Control and Prevention). You can find other support for quitting smoking (smoking cessation) through smokefree.gov and other websites.  How will I feel when I quit smoking?  Within the first 24 hours of quitting smoking, you may start to feel some withdrawal symptoms. These symptoms are usually most noticeable 2-3 days after quitting, but they usually do not last beyond 2-3 weeks. Changes or symptoms that you might experience include:  · Mood swings.  · Restlessness, anxiety, or irritation.  · Difficulty concentrating.  · Dizziness.  · Strong cravings for sugary foods in addition to nicotine.  · Mild weight gain.  · Constipation.  · Nausea.  · Coughing or a sore throat.  · Changes in how your medicines work in your body.  · A depressed mood.  · Difficulty sleeping (insomnia).  After the first 2-3 weeks of quitting, you may start to notice more positive results, such as:  · Improved sense of smell and taste.  · Decreased coughing and sore throat.  · Slower heart rate.  · Lower blood pressure.  · Clearer skin.  · The ability to breathe more easily.  · Fewer sick days.  Quitting smoking is very challenging for most people. Do not get discouraged if you are not successful the first time. Some people need to make many attempts to quit before they achieve long-term success. Do your best to stick to your quit plan, and talk with your health care provider if you have any questions or concerns.  This information is not intended to replace advice given to you by your health care provider. Make sure you discuss any questions you have with your health care provider.  Document Released:  12/12/2002 Document Revised: 08/15/2017 Document Reviewed: 05/03/2016  [employer] Interactive Patient Education © 2017 [employer]    Nonspecific Chest Pain  Chest pain can be caused by many different conditions. There is always a chance that your pain could be related to something serious, such as a heart attack or a blood clot in your lungs. Chest pain can also be caused by conditions that are not life-threatening. If you have chest pain, it is very important to follow up with your health care provider.  What are the causes?  Causes of this condition include:  · Heartburn.  · Pneumonia or bronchitis.  · Anxiety or stress.  · Inflammation around your heart (pericarditis) or lung (pleuritis or pleurisy).  · A blood clot in your lung.  · A collapsed lung (pneumothorax). This can develop suddenly on its own (spontaneous pneumothorax) or from trauma to the chest.  · Shingles infection (varicella-zoster virus).  · Heart attack.  · Damage to the bones, muscles, and cartilage that make up your chest wall. This can include:  ¨ Bruised bones due to injury.  ¨ Strained muscles or cartilage due to frequent or repeated coughing or overwork.  ¨ Fracture to one or more ribs.  ¨ Sore cartilage due to inflammation (costochondritis).  What increases the risk?  Risk factors for this condition may include:  · Activities that increase your risk for trauma or injury to your chest.  · Respiratory infections or conditions that cause frequent coughing.  · Medical conditions or overeating that can cause heartburn.  · Heart disease or family history of heart disease.  · Conditions or health behaviors that increase your risk of developing a blood clot.  · Having had chicken pox (varicella zoster).  What are the signs or symptoms?  Chest pain can feel like:  · Burning or tingling on the surface of your chest or deep in your chest.  · Crushing, pressure, aching, or squeezing pain.  · Dull or sharp pain that is worse when you move, cough, or  take a deep breath.  · Pain that is also felt in your back, neck, shoulder, or arm, or pain that spreads to any of these areas.  Your chest pain may come and go, or it may stay constant.  How is this diagnosed?  Lab tests or other studies may be needed to find the cause of your pain. Your health care provider may have you take a test called an ECG (electrocardiogram). An ECG records your heartbeat patterns at the time the test is performed. You may also have other tests, such as:  · Transthoracic echocardiogram (TTE). In this test, sound waves are used to create a picture of the heart structures and to look at how blood flows through your heart.  · Transesophageal echocardiogram (CINDY). This is a more advanced imaging test that takes images from inside your body. It allows your health care provider to see your heart in finer detail.  · Cardiac monitoring. This allows your health care provider to monitor your heart rate and rhythm in real time.  · Holter monitor. This is a portable device that records your heartbeat and can help to diagnose abnormal heartbeats. It allows your health care provider to track your heart activity for several days, if needed.  · Stress tests. These can be done through exercise or by taking medicine that makes your heart beat more quickly.  · Blood tests.  · Other imaging tests.  How is this treated?  Treatment depends on what is causing your chest pain. Treatment may include:  · Medicines. These may include:  ¨ Acid blockers for heartburn.  ¨ Anti-inflammatory medicine.  ¨ Pain medicine for inflammatory conditions.  ¨ Antibiotic medicine, if an infection is present.  ¨ Medicines to dissolve blood clots.  ¨ Medicines to treat coronary artery disease (CAD).  · Supportive care for conditions that do not require medicines. This may include:  ¨ Resting.  ¨ Applying heat or cold packs to injured areas.  ¨ Limiting activities until pain decreases.  Follow these instructions at home:  Medicines    · If you were prescribed an antibiotic, take it as told by your health care provider. Do not stop taking the antibiotic even if you start to feel better.  · Take over-the-counter and prescription medicines only as told by your health care provider.  Lifestyle   · Do not use any products that contain nicotine or tobacco, such as cigarettes and e-cigarettes. If you need help quitting, ask your health care provider.  · Do not drink alcohol.  · Make lifestyle changes as directed by your health care provider. These may include:  ¨ Getting regular exercise. Ask your health care provider to suggest some activities that are safe for you.  ¨ Eating a heart-healthy diet. A registered dietitian can help you to learn healthy eating options.  ¨ Maintaining a healthy weight.  ¨ Managing diabetes, if necessary.  ¨ Reducing stress, such as with yoga or relaxation techniques.  General instructions   · Avoid any activities that bring on chest pain.  · If heartburn is the cause for your chest pain, raise (elevate) the head of your bed about 6 inches (15 cm) by putting blocks under the legs. Sleeping with more pillows does not effectively relieve heartburn because it only changes the position of your head.  · Keep all follow-up visits as told by your health care provider. This is important. This includes any further testing if your chest pain does not go away.  Contact a health care provider if:  · Your chest pain does not go away.  · You have a rash with blisters on your chest.  · You have a fever.  · You have chills.  Get help right away if:  · Your chest pain is worse.  · You have a cough that gets worse, or you cough up blood.  · You have severe pain in your abdomen.  · You have severe weakness.  · You faint.  · You have sudden, unexplained chest discomfort.  · You have sudden, unexplained discomfort in your arms, back, neck, or jaw.  · You have shortness of breath at any time.  · You suddenly start to sweat, or your skin gets  clammy.  · You feel nauseous or you vomit.  · You suddenly feel light-headed or dizzy.  · Your heart begins to beat quickly, or it feels like it is skipping beats.  These symptoms may represent a serious problem that is an emergency. Do not wait to see if the symptoms will go away. Get medical help right away. Call your local emergency services (911 in the [place] Do not drive yourself to the hospital.  This information is not intended to replace advice given to you by your health care provider. Make sure you discuss any questions you have with your health care provider.  Document Released: 09/27/2006 Document Revised: 09/11/2017 Document Reviewed: 09/11/2017  Elsevier Interactive Patient Education © 2017 [employer]

## 2018-03-27 NOTE — PROGRESS NOTES
Subjective   Nitin Solis is a 42 y.o. male     Chief Complaint   Patient presents with   • Coronary Artery Disease     presents as a follow up   • Chest Pain   • Shortness of Breath       HPI    Problem List:    1. CAD  1.1 Stress Test 6/28/16 - low risk  1.2 LHC 10/19/16 - obtuse 70%; first septal  60-80%; first diag 50-70%; EF 35%; Stent to LAD  2. Shortness of breath  2.1 Echo 6/28/16 - mild LVH; EF 45-50%  2.2 Echo 4/24/17-mild LVH, EF 50-55%, diastolic dysfunction 1, trace MR, trace TR  3. Palpitations  3.1 Event Monitro 11/4-11/10/16 -NSR  4. Dizziness   5. Hypothyroidism   6. Smokeless tobacco and smoking habituation  7. Cardiomyopathy   7.1 MUGA 11/2/16 - EF 44%    Patient is a 42-year-old male who presents today for follow-up.  He continues to have sometimes left anterior, sometimes right anterior, sometimes midsternum and sometimes between his shoulder blade pressure/tightness.  He also has weakness in his right arm.  He says that he will get short of breath, nauseated, lightheadedness and shortness of breath.  He has taken up to 3 nitroglycerin during one day.  He says that it can occur at any time but notices more when he is upset.  He was on isosorbide before but did not tolerate it however he's willing to try again.  He says he generally has palpitations only when he gets upset.  He will get dizzy especially when he bends over or if he stands up quickly.  He denies any presyncope, syncope, orthopnea, PND or edema.  He says he's been smothering a lot more here lately.  He is fatigued all the time.  He says his wife feels like he needs to have anger management.  He still uses one can of the per day and smokes a half a pack a day.  We did orthostatics and they were negative.  Patient said he has a huge not come up on his right thigh and had ultrasound, but he has not received those results yet.      Current Outpatient Prescriptions   Medication Sig Dispense Refill   • aspirin 81 MG tablet  Take 325 mg by mouth Daily. 30 tablet 11   • atorvastatin (LIPITOR) 40 MG tablet TAKE 1 TABLET BY MOUTH DAILY FOR CHOLESTEROL 30 tablet 5   • carvedilol (COREG) 6.25 MG tablet TAKE 1 TABLET BY MOUTH TWICE A DAY WITH FOOD FOR HEART RATE & BLOODPRESSURE 180 tablet 5   • clopidogrel (PLAVIX) 75 MG tablet TAKE 1 TABLET BY MOUTH DAILY FOR HEART & A BLOOD THINNER 30 tablet 5   • levothyroxine (SYNTHROID, LEVOTHROID) 25 MCG tablet Take 25 mcg by mouth.     • lisinopril (PRINIVIL,ZESTRIL) 20 MG tablet Daily.     • nitroglycerin (NITROSTAT) 0.4 MG SL tablet 1 under the tongue as needed for angina, may repeat q5mins for up three doses 30 tablet 3   • isosorbide mononitrate (IMDUR) 30 MG 24 hr tablet Take 1/2 tablet daily 30 tablet 11     No current facility-administered medications for this visit.        ALLERGIES    Review of patient's allergies indicates no known allergies.    Past Medical History:   Diagnosis Date   • Gallbladder abscess    • Hypertension    • Hypothyroidism        Social History     Social History   • Marital status:      Spouse name: N/A   • Number of children: N/A   • Years of education: N/A     Occupational History   • Contractor      Social History Main Topics   • Smoking status: Current Every Day Smoker     Packs/day: 0.50     Types: Cigarettes     Start date: 5/10/1991   • Smokeless tobacco: Current User     Types: Chew   • Alcohol use No   • Drug use: No   • Sexual activity: Defer     Other Topics Concern   • Not on file     Social History Narrative   • No narrative on file       Family History   Problem Relation Age of Onset   • Heart disease Mother    • Valvular heart disease Mother    • Heart attack Father    • Heart failure Paternal Uncle        Review of Systems   Constitutional: Positive for diaphoresis (with CP ) and fatigue.   HENT: Positive for rhinorrhea, sinus pressure and sneezing.    Eyes: Negative for visual disturbance.   Respiratory: Positive for shortness of breath (a lot  "here lately with any activity; with CP ). Negative for chest tightness.    Cardiovascular: Positive for chest pain ( patient states at times he takes nitro \"like candy\" and others he doesn't take any and he is fine; sometimes right, left and sometimes middle and sometimes mid back up to neck; pressure; no more than 3 in one day) and palpitations. Negative for leg swelling.   Gastrointestinal: Positive for diarrhea and nausea (with CP ). Negative for constipation and vomiting.   Endocrine: Negative.    Genitourinary: Negative for difficulty urinating.   Musculoskeletal: Positive for arthralgias (having problems gripping with right hand ), back pain, myalgias and neck pain (when he gets aggravated).   Skin: Negative.    Allergic/Immunologic: Positive for environmental allergies.   Neurological: Positive for dizziness (when he stands up or bends over ), weakness (right arm, bad neck problems; notices when he is aggravated ), light-headedness (with CP ) and headaches ( will having a headache some with the lightheadedness ).   Hematological: Bruises/bleeds easily (bruises easily ).   Psychiatric/Behavioral: The patient is nervous/anxious.        Objective   /87 (BP Location: Left arm, Patient Position: Standing)   Pulse 91   Ht 167.6 cm (66\")   Wt 92.4 kg (203 lb 12.8 oz)   SpO2 97%   BMI 32.89 kg/m²   Vitals:    03/27/18 1058 03/27/18 1146 03/27/18 1147 03/27/18 1148   BP: 132/85 129/87 136/88 122/87   BP Location: Left arm Left arm Left arm Left arm   Patient Position: Sitting Lying Sitting Standing   Pulse: 76 74 79 91   SpO2: 97%      Weight: 92.4 kg (203 lb 12.8 oz)      Height: 167.6 cm (66\")         Lab Results (most recent)     None        Physical Exam   Constitutional: He is oriented to person, place, and time. Vital signs are normal. He appears well-developed and well-nourished. He is active and cooperative.   HENT:   Head: Normocephalic.   Eyes: Lids are normal.   Neck: Normal carotid pulses, no " hepatojugular reflux and no JVD present. Carotid bruit is not present.   Cardiovascular: Normal rate, regular rhythm and normal heart sounds.    Pulses:       Radial pulses are 2+ on the right side, and 2+ on the left side.        Dorsalis pedis pulses are 2+ on the right side, and 2+ on the left side.        Posterior tibial pulses are 2+ on the right side, and 2+ on the left side.   No edema BLE.    Pulmonary/Chest: Effort normal. He has decreased breath sounds in the right lower field and the left lower field.   Abdominal: Normal appearance and bowel sounds are normal.   Neurological: He is alert and oriented to person, place, and time.   Skin: Skin is warm, dry and intact.   Psychiatric: His speech is normal and behavior is normal. Judgment and thought content normal. Cognition and memory are normal.   Flat        Procedure     ECG 12 Lead  Date/Time: 3/27/2018 11:26 AM  Performed by: QUIANA JAIME  Authorized by: QUIANA JAIME   Comparison: compared with previous ECG from 4/14/2017  Rhythm: sinus rhythm  Rate: normal  BPM: 78  T flattening: II and aVF  QRS axis: normal  Clinical impression: abnormal ECG                 Assessment/Plan      Diagnosis Plan   1. Coronary artery disease involving native coronary artery of native heart with angina pectoris  Lipid Panel    ECG 12 Lead    Stress Test With Myocardial Perfusion One Day    Adult Transthoracic Echo Complete W/ Cont if Necessary Per Protocol    Lipid Panel    Stress Test With Myocardial Perfusion One Day    Adult Transthoracic Echo Complete W/ Cont if Necessary Per Protocol   2. Essential hypertension  Comprehensive Metabolic Panel    TSH    ECG 12 Lead    Stress Test With Myocardial Perfusion One Day    Adult Transthoracic Echo Complete W/ Cont if Necessary Per Protocol    Comprehensive Metabolic Panel    TSH    Stress Test With Myocardial Perfusion One Day    Adult Transthoracic Echo Complete W/ Cont if Necessary Per Protocol   3. Hyperlipidemia,  unspecified hyperlipidemia type  Lipid Panel    Stress Test With Myocardial Perfusion One Day    Adult Transthoracic Echo Complete W/ Cont if Necessary Per Protocol    Lipid Panel    Stress Test With Myocardial Perfusion One Day    Adult Transthoracic Echo Complete W/ Cont if Necessary Per Protocol   4. Smokeless tobacco use  Stress Test With Myocardial Perfusion One Day    Adult Transthoracic Echo Complete W/ Cont if Necessary Per Protocol    Stress Test With Myocardial Perfusion One Day    Adult Transthoracic Echo Complete W/ Cont if Necessary Per Protocol   5. Smoking  Stress Test With Myocardial Perfusion One Day    Adult Transthoracic Echo Complete W/ Cont if Necessary Per Protocol    Stress Test With Myocardial Perfusion One Day    Adult Transthoracic Echo Complete W/ Cont if Necessary Per Protocol   6. Healthcare maintenance  Lipid Panel    Comprehensive Metabolic Panel    TSH    Lipid Panel    Comprehensive Metabolic Panel    TSH   7. Other chest pain  ECG 12 Lead    Troponin I    CK-MB    proBNP    D-dimer, Quantitative    Stress Test With Myocardial Perfusion One Day    Adult Transthoracic Echo Complete W/ Cont if Necessary Per Protocol    isosorbide mononitrate (IMDUR) 30 MG 24 hr tablet    Troponin I    CK-MB    proBNP    D-dimer, Quantitative    Stress Test With Myocardial Perfusion One Day    Adult Transthoracic Echo Complete W/ Cont if Necessary Per Protocol   8. Shortness of breath  ECG 12 Lead    Troponin I    CK-MB    proBNP    D-dimer, Quantitative    Stress Test With Myocardial Perfusion One Day    Adult Transthoracic Echo Complete W/ Cont if Necessary Per Protocol    Troponin I    CK-MB    proBNP    D-dimer, Quantitative    Stress Test With Myocardial Perfusion One Day    Adult Transthoracic Echo Complete W/ Cont if Necessary Per Protocol       Return in about 8 weeks (around 5/22/2018).    CAD/hypertension/hyperlipidemia/smoking/chest pain/shortness of breath-patient will have ischemia workup,  stress and echo.  He will restart Imdur half tab daily.  He will use nitroglycerin when necessary for chest pain no resolution he will go to the ER.  He will get a troponin, CK-MB, BNP, d-dimer, TSH, CMP and lipids.  He will continue his medication regimen.  Tobacco use-he was encouraged on cessation.  He will follow-up in 8 weeks or sooner if any changes.     Called [facility] and they will fax me the ultrasound.    I advised the patient of the risks in continuing to use tobacco, and I provided this patient with smoking cessation educational materials.    During this visit, I spent < 3 minutes counseling the patient regarding smoking cessation.    Discussed the patient's BMI with him. BMI is above normal parameters. Follow-up plan includes:  educational material.      Electronically signed by:

## 2018-03-29 ENCOUNTER — TELEPHONE (OUTPATIENT)
Dept: CARDIOLOGY | Facility: CLINIC | Age: 43
End: 2018-03-29

## 2018-03-29 NOTE — TELEPHONE ENCOUNTER
Patient wanted me to call with results of Thigh ultrasound done through PCP once I received it.  I called him this AM and TRC.  If patient calls in nurse can go over his thigh ultrasound that is under Media.

## 2018-06-28 DIAGNOSIS — M79.641 RIGHT HAND PAIN: Primary | ICD-10-CM

## 2018-06-29 ENCOUNTER — APPOINTMENT (OUTPATIENT)
Dept: GENERAL RADIOLOGY | Facility: HOSPITAL | Age: 43
End: 2018-06-29
Attending: ORTHOPAEDIC SURGERY

## 2019-08-06 ENCOUNTER — TELEPHONE (OUTPATIENT)
Dept: CARDIOLOGY | Facility: CLINIC | Age: 44
End: 2019-08-06

## 2019-08-06 ENCOUNTER — LAB (OUTPATIENT)
Dept: LAB | Facility: HOSPITAL | Age: 44
End: 2019-08-06

## 2019-08-06 ENCOUNTER — APPOINTMENT (OUTPATIENT)
Dept: LAB | Facility: HOSPITAL | Age: 44
End: 2019-08-06

## 2019-08-06 ENCOUNTER — OFFICE VISIT (OUTPATIENT)
Dept: CARDIOLOGY | Facility: CLINIC | Age: 44
End: 2019-08-06

## 2019-08-06 VITALS
WEIGHT: 212 LBS | OXYGEN SATURATION: 97 % | DIASTOLIC BLOOD PRESSURE: 91 MMHG | HEART RATE: 85 BPM | SYSTOLIC BLOOD PRESSURE: 151 MMHG | BODY MASS INDEX: 36.19 KG/M2 | HEIGHT: 64 IN

## 2019-08-06 DIAGNOSIS — I42.9 CARDIOMYOPATHY (HCC): ICD-10-CM

## 2019-08-06 DIAGNOSIS — R06.02 SHORTNESS OF BREATH: ICD-10-CM

## 2019-08-06 DIAGNOSIS — I25.119 CORONARY ARTERY DISEASE INVOLVING NATIVE CORONARY ARTERY OF NATIVE HEART WITH ANGINA PECTORIS (HCC): ICD-10-CM

## 2019-08-06 DIAGNOSIS — F17.200 SMOKING: ICD-10-CM

## 2019-08-06 DIAGNOSIS — I10 ESSENTIAL HYPERTENSION: ICD-10-CM

## 2019-08-06 DIAGNOSIS — R42 DIZZINESS: ICD-10-CM

## 2019-08-06 DIAGNOSIS — Z72.0 SMOKELESS TOBACCO USE: ICD-10-CM

## 2019-08-06 DIAGNOSIS — R07.89 OTHER CHEST PAIN: Primary | ICD-10-CM

## 2019-08-06 DIAGNOSIS — R55 SYNCOPE, UNSPECIFIED SYNCOPE TYPE: ICD-10-CM

## 2019-08-06 DIAGNOSIS — R00.2 PALPITATIONS: ICD-10-CM

## 2019-08-06 DIAGNOSIS — R07.89 OTHER CHEST PAIN: ICD-10-CM

## 2019-08-06 DIAGNOSIS — E78.5 HYPERLIPIDEMIA, UNSPECIFIED HYPERLIPIDEMIA TYPE: ICD-10-CM

## 2019-08-06 LAB
CK MB SERPL-CCNC: 2.26 NG/ML
D DIMER PPP FEU-MCNC: <0.27 MCGFEU/ML (ref 0–0.5)
TROPONIN T SERPL-MCNC: <0.01 NG/ML (ref 0–0.03)

## 2019-08-06 PROCEDURE — 85379 FIBRIN DEGRADATION QUANT: CPT | Performed by: NURSE PRACTITIONER

## 2019-08-06 PROCEDURE — 99214 OFFICE O/P EST MOD 30 MIN: CPT | Performed by: NURSE PRACTITIONER

## 2019-08-06 PROCEDURE — 82553 CREATINE MB FRACTION: CPT | Performed by: NURSE PRACTITIONER

## 2019-08-06 PROCEDURE — 84484 ASSAY OF TROPONIN QUANT: CPT | Performed by: NURSE PRACTITIONER

## 2019-08-06 PROCEDURE — 36415 COLL VENOUS BLD VENIPUNCTURE: CPT

## 2019-08-06 PROCEDURE — 93000 ELECTROCARDIOGRAM COMPLETE: CPT | Performed by: NURSE PRACTITIONER

## 2019-08-06 RX ORDER — CARVEDILOL 12.5 MG/1
12.5 TABLET ORAL 2 TIMES DAILY WITH MEALS
Qty: 60 TABLET | Refills: 11 | Status: SHIPPED | OUTPATIENT
Start: 2019-08-06

## 2019-08-06 RX ORDER — RANOLAZINE 500 MG/1
500 TABLET, EXTENDED RELEASE ORAL 2 TIMES DAILY
Qty: 60 TABLET | Refills: 11 | Status: SHIPPED | OUTPATIENT
Start: 2019-08-06

## 2019-08-06 NOTE — PATIENT INSTRUCTIONS
Smokeless Tobacco Information, Adult  Tobacco use is one of the leading causes of cancer and other chronic health problems. Smokeless tobacco is tobacco that is put directly into the mouth instead of being smoked. It may also be called chewing tobacco or snuff. Smokeless tobacco is made from the leaves of tobacco plants and it comes in several forms:  · Loose, dry leaves, plugs, or twists.  · Moist pouches.  · Dissolving lozenges or strips.  Chewing, sucking, or holding the tobacco in your mouth causes your mouth to make more saliva. The saliva mixes with the tobacco to make “tobacco juice” that is swallowed or spit out.  How can smokeless tobacco affect me?  Using smokeless tobacco:  · Increases your risk of developing cancer. Smokeless tobacco contains at least 28 different types of cancer-causing chemicals (carcinogens).  · Increases your chances of developing other long-term health problems, including high blood pressure, heart disease, stroke, and dental problems.  · Can make you become addicted. Nicotine is one of the chemicals in tobacco. When you chew tobacco, you absorb nicotine from the tobacco juice. This can make you feel more alert than usual.  · Can cause problems with pregnancy. Pregnant women who use smokeless tobacco are more likely to miscarry or deliver a baby too early (premature delivery).  · Can affect the appearance and health of your mouth. Using smokeless tobacco may cause bad breath, yellow-brown teeth, mouth sores, cracking and bleeding lips, gum recession, and lesions on the soft tissues of your mouth (leukoplakia).  What are the benefits of not using smokeless tobacco?  The benefits of not using smokeless tobacco include:  · A healthy mind because:  ? You avoid addiction.  · A healthy body because:  ? You avoid dental problems.  ? You promote healthy pregnancy.  ? You avoid long-term health problems.  · A healthy wallet because:  ? You avoid costs of buying tobacco.  ? You avoid health  care costs in the future.  · A healthy family because:  ? You avoid accidental poisoning of children in your household.  What can happen if I continue to use smokeless tobacco?  If you continue to use smokeless tobacco, you will increase your risk for developing certain cancers. These include:  · Tongue.  · Lips, mouth, and gums.  · Throat (esophagus) and voice box (larynx).  · Stomach.  · Pancreas.  · Bladder.  · Colon.  Long-term use of smokeless tobacco can also lead to:  · High blood pressure, heart disease, and stroke.  · Gum disease, gum recession, and bone loss around the teeth.  · Tooth decay.  How do I quit using smokeless tobacco?  Quitting the use of smokeless tobacco can be hard, but it can be done. Follow these steps:  · Pick a date to quit. Set a date within the next two weeks. This gives you time to prepare.  · Write down the reasons why you are quitting. Keep this list in places where you will see it often, such as on your bathroom mirror or in your car or wallet.  · Identify the people, places, things, and activities that make you want to use tobacco (triggers) and avoid them.  · Get rid of any tobacco you have and remove any tobacco smells. To do this:  ? Throw away all containers of tobacco at home, at work, and in your car.  ? Throw away any other items that you use regularly when you chew tobacco.  ? Clean your car and make sure to remove all tobacco-related items.  ? Clean your home, including curtains and carpets.  · Tell your family, friends, and coworkers that you are quitting. This can make quitting easier.  · Ask your health care provider for help quitting smokeless tobacco. This may involve treatment. Find out what treatment options are covered by your health insurance.  · Keep track of how many days have passed since you quit. Remembering how long and hard you have worked to quit can help you avoid using tobacco again.  Where can I get support?  Ask your health care provider if there is  a local support group for quitting smokeless tobacco.  Where can I get more information?  You can learn more about the risks of using smokeless tobacco and the benefits of quitting from these sources:  · National Cancer [place]: www.cancer.gov  · American Cancer Society: www.cancer.org  When should I seek medical care?  Seek medical care if you have:  · White or other discolored patches in your mouth.  · Difficulty swallowing.  · A change in your voice.  · Unexplained weight loss.  · Stomach pain, nausea, or vomiting.  Summary  · Smokeless tobacco contains at least 28 different chemicals that are known to cause cancer (carcinogen).  · Nicotine is an addictive chemical in smokeless tobacco.  · When you quit using smokeless tobacco, you lower your risk of developing cancer.  This information is not intended to replace advice given to you by your health care provider. Make sure you discuss any questions you have with your health care provider.  Document Released: 05/21/2012 Document Revised: 08/03/2018 Document Reviewed: 07/29/2016  [employer] Interactive Patient Education © 2019 [employer]  Steps to Quit Smoking    Smoking tobacco can be bad for your health. It can also affect almost every organ in your body. Smoking puts you and people around you at risk for many serious long-lasting (chronic) diseases. Quitting smoking is hard, but it is one of the best things that you can do for your health. It is never too late to quit.  What are the benefits of quitting smoking?  When you quit smoking, you lower your risk for getting serious diseases and conditions. They can include:  · Lung cancer or lung disease.  · Heart disease.  · Stroke.  · Heart attack.  · Not being able to have children (infertility).  · Weak bones (osteoporosis) and broken bones (fractures).  If you have coughing, wheezing, and shortness of breath, those symptoms may get better when you quit. You may also get sick less often. If you are pregnant,  quitting smoking can help to lower your chances of having a baby of low birth weight.  What can I do to help me quit smoking?  Talk with your doctor about what can help you quit smoking. Some things you can do (strategies) include:  · Quitting smoking totally, instead of slowly cutting back how much you smoke over a period of time.  · Going to in-person counseling. You are more likely to quit if you go to many counseling sessions.  · Using resources and support systems, such as:  ? Online chats with a counselor.  ? Phone quitlines.  ? Printed self-help materials.  ? Support groups or group counseling.  ? Text messaging programs.  ? Mobile phone apps or applications.  · Taking medicines. Some of these medicines may have nicotine in them. If you are pregnant or breastfeeding, do not take any medicines to quit smoking unless your doctor says it is okay. Talk with your doctor about counseling or other things that can help you.  Talk with your doctor about using more than one strategy at the same time, such as taking medicines while you are also going to in-person counseling. This can help make quitting easier.  What things can I do to make it easier to quit?  Quitting smoking might feel very hard at first, but there is a lot that you can do to make it easier. Take these steps:  · Talk to your family and friends. Ask them to support and encourage you.  · Call phone quitlines, reach out to support groups, or work with a counselor.  · Ask people who smoke to not smoke around you.  · Avoid places that make you want (trigger) to smoke, such as:  ? Bars.  ? Parties.  ? Smoke-break areas at work.  · Spend time with people who do not smoke.  · Lower the stress in your life. Stress can make you want to smoke. Try these things to help your stress:  ? Getting regular exercise.  ? Deep-breathing exercises.  ? Yoga.  ? Meditating.  ? Doing a body scan. To do this, close your eyes, focus on one area of your body at a time from head  "to toe, and notice which parts of your body are tense. Try to relax the muscles in those areas.  · Download or buy apps on your mobile phone or tablet that can help you stick to your quit plan. There are many free apps, such as QuitGuide from the CDC (Centers for Disease Control and Prevention). You can find more support from smokefree.gov and other websites.  This information is not intended to replace advice given to you by your health care provider. Make sure you discuss any questions you have with your health care provider.  Document Released: 10/14/2010 Document Revised: 08/15/2017 Document Reviewed: 05/03/2016  [employer] Interactive Patient Education © 2019 [employer]  Fat and Cholesterol Restricted Eating Plan  Getting too much fat and cholesterol in your diet may cause health problems. Choosing the right foods helps keep your fat and cholesterol at normal levels. This can keep you from getting certain diseases.  Your doctor may recommend an eating plan that includes:  · Total fat: ______% or less of total calories a day.  · Saturated fat: ______% or less of total calories a day.  · Cholesterol: less than _________mg a day.  · Fiber: ______g a day.  What are tips for following this plan?  General tips    · Work with your doctor to lose weight if you need to.  · Avoid:  ? Foods with added sugar.  ? Fried foods.  ? Foods with partially hydrogenated oils.  · Limit alcohol intake to no more than 1 drink a day for nonpregnant women and 2 drinks a day for men. One drink equals 12 oz of beer, 5 oz of wine, or 1½ oz of hard liquor.  Reading food labels  · Check food labels for:  ? Trans fats.  ? Partially hydrogenated oils.  ? Saturated fat (g) in each serving.  ? Cholesterol (mg) in each serving.  ? Fiber (g) in each serving.  · Choose foods with healthy fats, such as:  ? Monounsaturated fats.  ? Polyunsaturated fats.  ? Omega-3 fats.  · Choose grain products that have whole grains. Look for the word \"whole\" as " the first word in the ingredient list.  Cooking  · Cook foods using low-fat methods. These include baking, boiling, grilling, and broiling.  · Eat more home-cooked foods. Eat at restaurants and buffets less often.  · Avoid cooking using saturated fats, such as butter, cream, palm oil, palm kernel oil, and coconut oil.  Meal planning    · At meals, divide your plate into four equal parts:  ? Fill one-half of your plate with vegetables and green salads.  ? Fill one-fourth of your plate with whole grains.  ? Fill one-fourth of your plate with low-fat (lean) protein foods.  · Eat fish that is high in omega-3 fats at least two times a week. This includes mackerel, tuna, sardines, and salmon.  · Eat foods that are high in fiber, such as whole grains, beans, apples, broccoli, carrots, peas, and barley.  Recommended foods  Grains  · Whole grains, such as whole wheat or whole grain breads, crackers, cereals, and pasta. Unsweetened oatmeal, bulgur, barley, quinoa, or brown rice. Corn or whole wheat flour tortillas.  Vegetables  · Fresh or frozen vegetables (raw, steamed, roasted, or grilled). Green salads.  Fruits  · All fresh, canned (in natural juice), or frozen fruits.  Meats and other protein foods  · Ground beef (85% or leaner), grass-fed beef, or beef trimmed of fat. Skinless chicken or turkey. Ground chicken or turkey. Pork trimmed of fat. All fish and seafood. Egg whites. Dried beans, peas, or lentils. Unsalted nuts or seeds. Unsalted canned beans. Nut butters without added sugar or oil.  Dairy  · Low-fat or nonfat dairy products, such as skim or 1% milk, 2% or reduced-fat cheeses, low-fat and fat-free ricotta or cottage cheese, or plain low-fat and nonfat yogurt.  Fats and oils  · Tub margarine without trans fats. Light or reduced-fat mayonnaise and salad dressings. Avocado. Olive, canola, sesame, or safflower oils.  The items listed above may not be a complete list of recommended foods or beverages. Contact your  dietitian for more options.  The items listed above may not be a complete list of foods and beverages [you/your child] can eat. Contact a dietitian for more information.  Foods to avoid  Grains  · White bread. White pasta. White rice. Cornbread. Bagels, pastries, and croissants. Crackers and snack foods that contain trans fat and hydrogenated oils.  Vegetables  · Vegetables cooked in cheese, cream, or butter sauce. Fried vegetables.  Fruits  · Canned fruit in heavy syrup. Fruit in cream or butter sauce. Fried fruit.  Meats and other protein foods  · Fatty cuts of meat. Ribs, chicken wings, cerda, sausage, bologna, salami, chitterlings, fatback, hot dogs, bratwurst, and packaged lunch meats. Liver and organ meats. Whole eggs and egg yolks. Chicken and turkey with skin. Fried meat.  Dairy  · Whole or 2% milk, cream, half-and-half, and cream cheese. Whole milk cheeses. Whole-fat or sweetened yogurt. Full-fat cheeses. Nondairy creamers and whipped toppings. Processed cheese, cheese spreads, and cheese curds.  Beverages  · Alcohol. Sugar-sweetened drinks such as sodas, lemonade, and fruit drinks.  Fats and oils  · Butter, stick margarine, lard, shortening, ghee, or cerda fat. Coconut, palm kernel, and palm oils.  Sweets and desserts  · Corn syrup, sugars, honey, and molasses. Candy. Jam and jelly. Syrup. Sweetened cereals. Cookies, pies, cakes, donuts, muffins, and ice cream.  The items listed above may not be a complete list of foods and beverages to avoid. Contact your dietitian for more information.  The items listed above may not be a complete list of foods and beverages [you/your child] should avoid. Contact a dietitian for more information.  Summary  · Choosing the right foods helps keep your fat and cholesterol at normal levels. This can keep you from getting certain diseases.  · At meals, fill one-half of your plate with vegetables and green salads.  · Eat high-fiber foods, like whole grains, beans, apples,  "carrots, peas, and barley.  · Limit added sugar, saturated fats, alcohol, and fried foods.  This information is not intended to replace advice given to you by your health care provider. Make sure you discuss any questions you have with your health care provider.  Document Released: 06/18/2013 Document Revised: 09/04/2018 Document Reviewed: 09/04/2018  [employer] Interactive Patient Education © 2019 [employer]  BMI for Adults    Body mass index (BMI) is a number that is calculated from a person's weight and height. BMI may help to estimate how much of a person's weight is composed of fat. BMI can help identify those who may be at higher risk for certain medical problems.  How is BMI used with adults?  BMI is used as a screening tool to identify possible weight problems. It is used to check whether a person is obese, overweight, healthy weight, or underweight.  How is BMI calculated?  BMI measures your weight and compares it to your height. This can be done either in English ([place] or metric measurements. Note that charts are available to help you find your BMI quickly and easily without having to do these calculations yourself.  To calculate your BMI in English ([place] measurements, your health care provider will:  1. Measure your weight in pounds (lb).  2. Multiply the number of pounds by 703.  ? For example, for a person who weighs 180 lb, multiply that number by 703, which equals 126,540.  3. Measure your height in inches (in). Then multiply that number by itself to get a measurement called \"inches squared.\"  ? For example, for a person who is 70 in tall, the \"inches squared\" measurement is 70 in x 70 in, which equals 4900 inches squared.  4. Divide the total from Step 2 (number of lb x 703) by the total from Step 3 (inches squared): 126,540 ÷ 4900 = 25.8. This is your BMI.  To calculate your BMI in metric measurements, your health care provider will:  1. Measure your weight in kilograms (kg).  2. Measure your " "height in meters (m). Then multiply that number by itself to get a measurement called \"meters squared.\"  ? For example, for a person who is 1.75 m tall, the \"meters squared\" measurement is 1.75 m x 1.75 m, which is equal to 3.1 meters squared.  3. Divide the number of kilograms (your weight) by the meters squared number. In this example: 70 ÷ 3.1 = 22.6. This is your BMI.  How is BMI interpreted?  To interpret your results, your health care provider will use BMI charts to identify whether you are underweight, normal weight, overweight, or obese. The following guidelines will be used:  · Underweight: BMI less than 18.5.  · Normal weight: BMI between 18.5 and 24.9.  · Overweight: BMI between 25 and 29.9.  · Obese: BMI of 30 and above.  Please note:  · Weight includes both fat and muscle, so someone with a muscular build, such as an athlete, may have a BMI that is higher than 24.9. In cases like these, BMI is not an accurate measure of body fat.  · To determine if excess body fat is the cause of a BMI of 25 or higher, further assessments may need to be done by a health care provider.  · BMI is usually interpreted in the same way for men and women.  Why is BMI a useful tool?  BMI is useful in two ways:  · Identifying a weight problem that may be related to a medical condition, or that may increase the risk for medical problems.  · Promoting lifestyle and diet changes in order to reach a healthy weight.  Summary  · Body mass index (BMI) is a number that is calculated from a person's weight and height.  · BMI may help to estimate how much of a person's weight is composed of fat. BMI can help identify those who may be at higher risk for certain medical problems.  · BMI can be measured using English measurements or metric measurements.  · To interpret your results, your health care provider will use BMI charts to identify whether you are underweight, normal weight, overweight, or obese.  This information is not intended to " replace advice given to you by your health care provider. Make sure you discuss any questions you have with your health care provider.  Document Released: 08/29/2005 Document Revised: 10/31/2018 Document Reviewed: 10/31/2018  [email or web address] Interactive Patient Education © 2019 [email or web address]    Nonspecific Chest Pain  Chest pain can be caused by many different conditions. It can be caused by something serious that needs treatment right away. This includes:  · Heart attack.  · A tear in the body's main blood vessel.  · Redness and swelling (inflammation) around your heart.  · Blood clot in your lungs.  It can be caused by something that is not as serious. This includes:  · Heartburn.  · Anxiety or stress.  · Damage to bones or muscles in your chest.  · Lung infections.  See your doctor right away if you have chest pain. This is important.  Follow these instructions at home:  Medicines  · Take over-the-counter and prescription medicines only as told by your doctor.  · If you were prescribed an antibiotic medicine, take it as told by your doctor. Do not stop taking the antibiotic even if you start to feel better.  Lifestyle    · Rest as told by your doctor.  · Do not use any products that contain nicotine or tobacco, such as cigarettes and e-cigarettes. If you need help quitting, ask your doctor.  · Do not drink alcohol.  · Make lifestyle changes as told by your doctor. These may include:  ? Getting regular exercise. Ask your doctor what activities are safe for you.  ? Eating a heart-healthy diet. A diet and nutrition specialist (dietitian) can help you to learn healthy eating options.  ? Staying at a healthy weight.  ? Managing diabetes, if needed.  ? Lowering your stress. Activities such as yoga and relaxation techniques can help.  General instructions  · Avoid any activities that cause you to have chest pain.  · Keep all follow-up visits as told by your doctor. This is important. You may need more testing if your chest  pain does not go away.  Contact a doctor if:  · Your chest pain does not go away.  · You feel depressed.  · You have a fever.  Get help right away if:  · Your chest pain is worse.  · You have a cough that gets worse, or you cough up blood.  · You have very bad (severe) pain in your belly (abdomen).  · You pass out (faint).  · You have either of these for no clear reason:  ? Sudden chest discomfort.  ? Sudden discomfort in your arms, back, neck, or jaw.  · You have shortness of breath at any time.  · You suddenly start to sweat, or your skin gets clammy.  · You feel sick to your stomach (nauseous).  · You throw up (vomit).  · You suddenly feel lightheaded or dizzy.  · You feel very weak or tired.  · Your heart starts to beat fast, or it feels like it is skipping beats.  These symptoms may be an emergency. Do not wait to see if the symptoms will go away. Get medical help right away. Call your local emergency services (911 in the [place] Do not drive yourself to the hospital.  Summary  · Chest pain can be caused by many different conditions. The cause may be serious and need treatment right away. If you have chest pain, see your doctor right away.  · Follow your doctor's instructions for taking medicines and making lifestyle changes. Keep all follow-up visits as told by your doctor. This includes visits for any further testing if your chest pain does not go away.  · Know what signs mean you should get medical help right away.  This information is not intended to replace advice given to you by your health care provider. Make sure you discuss any questions you have with your health care provider.  Document Released: 06/05/2009 Document Revised: 02/08/2019 Document Reviewed: 02/08/2019  [employer] Interactive Patient Education © 2019 [employer]

## 2019-08-06 NOTE — PROGRESS NOTES
Subjective   Nitin Solis is a 43 y.o. male     Chief Complaint   Patient presents with   • Follow-up     Pt re-referred for syncope   • Loss of Consciousness   • Coronary Artery Disease       HPI    Problem List:    1. CAD  1.1 Stress Test 6/28/16 - low risk  1.2 LHC 10/19/16 - obtuse 70%; first septal  60-80%; first diag 50-70%; EF 35%; Stent to LAD  2. Shortness of breath  2.1 Echo 6/28/16 - mild LVH; EF 45-50%  2.2 Echo 4/24/17-mild LVH, EF 50-55%, diastolic dysfunction 1, trace MR, trace TR  3. Palpitations  3.1 Event Monitro 11/4-11/10/16 -NSR  4. Dizziness   5. Hypothyroidism   6. Smokeless tobacco and smoking habituation  7. Cardiomyopathy   7.1 MUGA 11/2/16 - EF 44%    Patient is a 43-year-old male who presents today for follow-up.  He was supposed to have an ischemia work-up after his last over one year ago that never occurred.  Patient continues to have sharp left anterior chest pain and also tightness.  He says whenever this occurs it radiates into his left arm and legs.  He says he has been having it more frequent lately.  He says it can occur at any time but mostly occurs whenever he is aggravated with the people at work.  He has taken up to 3 nitroglycerin.  He was intolerant to isosorbide.  He says that he does get short of breath, diaphoretic, nauseated and lightheaded whenever this occurs.  He says that he does have flutters that are separate from his chest pain and he usually will have to take a deep breath.  He says he does get dizzy a lot whenever he is aggravated.  He says he is also had episodes of passing out about 4 to 5 months ago when he was aggravated.  He denies any orthopnea PND or edema.  He says that he does have shortness of breath with normal daily activity.  He has had increase in fatigue.  Patient says his wife lost about 4 months ago so he is unsure about his healthcare situation.  He was negative for orthostatic hypotension today.  He is still smoking about a half a  pack a day.    Current Outpatient Medications   Medication Sig Dispense Refill   • aspirin 81 MG tablet Take 325 mg by mouth Daily. 30 tablet 11   • atorvastatin (LIPITOR) 40 MG tablet TAKE 1 TABLET BY MOUTH DAILY FOR CHOLESTEROL 30 tablet 5   • carvedilol (COREG) 12.5 MG tablet Take 1 tablet by mouth 2 (Two) Times a Day With Meals. 60 tablet 11   • clopidogrel (PLAVIX) 75 MG tablet TAKE 1 TABLET BY MOUTH DAILY FOR HEART & A BLOOD THINNER 30 tablet 5   • levothyroxine (SYNTHROID, LEVOTHROID) 25 MCG tablet Take 25 mcg by mouth.     • lisinopril (PRINIVIL,ZESTRIL) 20 MG tablet Take 20 mg by mouth Daily.     • nitroglycerin (NITROSTAT) 0.4 MG SL tablet 1 under the tongue as needed for angina, may repeat q5mins for up three doses 30 tablet 3   • ranolazine (RANEXA) 500 MG 12 hr tablet Take 1 tablet by mouth 2 (Two) Times a Day. 60 tablet 11     No current facility-administered medications for this visit.        ALLERGIES    Patient has no known allergies.    Past Medical History:   Diagnosis Date   • COPD (chronic obstructive pulmonary disease) (CMS/[facility])    • Gallbladder abscess    • Hypertension    • Hypothyroidism    • ADRI (obstructive sleep apnea)    • Syncope        Social History     Socioeconomic History   • Marital status:      Spouse name: Not on file   • Number of children: Not on file   • Years of education: Not on file   • Highest education level: Not on file   Occupational History   • Occupation: Contractor   Tobacco Use   • Smoking status: Current Every Day Smoker     Packs/day: 0.50     Types: Cigarettes     Start date: 5/10/1991   • Smokeless tobacco: Current User     Types: Chew   Substance and Sexual Activity   • Alcohol use: No   • Drug use: No   • Sexual activity: Defer       Family History   Problem Relation Age of Onset   • Heart disease Mother    • Valvular heart disease Mother    • Heart attack Father    • Heart failure Paternal Uncle        Review of Systems   Constitutional: Positive for  "diaphoresis (with CP ) and fatigue.   HENT: Negative for congestion, rhinorrhea and sore throat.    Eyes: Positive for visual disturbance (Has glasses, doesn't wear them ).   Respiratory: Positive for apnea (CPAP), chest tightness and shortness of breath (w/ normal daily activity; with CP ).    Cardiovascular: Positive for chest pain (sharp left sided pain: increased SoA, gets sweaty, radiates to arm and legs; been getting more freq here lately; can occur at anytime;  mostly when aggravated at work; when the people he works with don't listen, up to 3 nitro ) and palpitations (flutters; separate and with CP; when he takes a deep breath). Negative for leg swelling.   Gastrointestinal: Positive for nausea (with CP ). Negative for abdominal pain, blood in stool, constipation, diarrhea and vomiting.   Endocrine: Positive for heat intolerance. Negative for cold intolerance.   Genitourinary: Negative for difficulty urinating, dysuria, frequency, hematuria and urgency.   Musculoskeletal: Positive for arthralgias, back pain and neck pain (\"killing him\").   Skin: Negative for rash and wound.   Allergic/Immunologic: Negative for environmental allergies and food allergies.   Neurological: Positive for dizziness (States \"a lot of it is when i'm aggrivated\" ), syncope (Pt states he started passing out 4-5 months ago, states it tends to happen when he's aggrivated ) and light-headedness (with CP). Negative for weakness, numbness and headaches.   Hematological: Bruises/bleeds easily (both ).   Psychiatric/Behavioral: Positive for sleep disturbance (States he struggles to fall asleep and stay asleep ).       Objective   /91 (BP Location: Left arm, Patient Position: Standing)   Pulse 85   Ht 162.6 cm (64\")   Wt 96.2 kg (212 lb)   SpO2 97%   BMI 36.39 kg/m²   Vitals:    08/06/19 0852 08/06/19 0904 08/06/19 0905   BP: 149/92 152/97 151/91   BP Location: Left arm Left arm Left arm   Patient Position: Lying Sitting Standing " "  Pulse: 82 79 85   SpO2: 97% 94% 97%   Weight: 96.2 kg (212 lb)     Height: 162.6 cm (64\")        Lab Results (most recent)     None        Physical Exam   Constitutional: He is oriented to person, place, and time. Vital signs are normal. He appears well-developed and well-nourished. He is active and cooperative.   HENT:   Head: Normocephalic.   Eyes: Lids are normal.   Neck: Normal carotid pulses, no hepatojugular reflux and no JVD present. Carotid bruit is not present.   Cardiovascular: Normal rate, regular rhythm and normal heart sounds.   Pulses:       Radial pulses are 2+ on the right side, and 2+ on the left side.        Dorsalis pedis pulses are 2+ on the right side, and 2+ on the left side.        Posterior tibial pulses are 2+ on the right side, and 2+ on the left side.   No edema BLE.   Pulmonary/Chest: Effort normal and breath sounds normal.   Abdominal: Normal appearance and bowel sounds are normal.   Neurological: He is alert and oriented to person, place, and time.   Skin: Skin is warm, dry and intact.   Psychiatric: His speech is normal and behavior is normal. Judgment and thought content normal. His affect is angry. Cognition and memory are normal.       Procedure     ECG 12 Lead  Date/Time: 8/6/2019 1:40 PM  Performed by: Chana Kumar APRN  Authorized by: Chana Kumar APRN   Comparison: compared with previous ECG from 4/14/2017  Comparison to previous ECG: q wave in II and AVF  Rhythm: sinus rhythm  Rate: normal  BPM: 80  Q waves: II and aVF    QRS axis: normal  Other findings: left ventricular hypertrophy    Clinical impression: abnormal EKG                 Assessment/Plan      Diagnosis Plan   1. Coronary artery disease involving native coronary artery of native heart with angina pectoris (CMS/HCC)  Stress Test With Myocardial Perfusion One Day    Adult Transthoracic Echo Complete W/ Cont if Necessary Per Protocol   2. Essential hypertension  Stress Test With Myocardial Perfusion One Day "    Adult Transthoracic Echo Complete W/ Cont if Necessary Per Protocol   3. Hyperlipidemia, unspecified hyperlipidemia type  Stress Test With Myocardial Perfusion One Day    Adult Transthoracic Echo Complete W/ Cont if Necessary Per Protocol   4. Shortness of breath  Stress Test With Myocardial Perfusion One Day    Adult Transthoracic Echo Complete W/ Cont if Necessary Per Protocol    Troponin    CK-MB   5. Dizziness  Stress Test With Myocardial Perfusion One Day    Adult Transthoracic Echo Complete W/ Cont if Necessary Per Protocol    US Carotid Bilateral    Cardiac Event Monitor   6. Smokeless tobacco use  Stress Test With Myocardial Perfusion One Day    Adult Transthoracic Echo Complete W/ Cont if Necessary Per Protocol    D-dimer, Quantitative   7. Smoking  Stress Test With Myocardial Perfusion One Day    Adult Transthoracic Echo Complete W/ Cont if Necessary Per Protocol   8. Syncope, unspecified syncope type  Stress Test With Myocardial Perfusion One Day    Adult Transthoracic Echo Complete W/ Cont if Necessary Per Protocol    US Carotid Bilateral    Cardiac Event Monitor   9. Other chest pain  ranolazine (RANEXA) 500 MG 12 hr tablet    Stress Test With Myocardial Perfusion One Day    Adult Transthoracic Echo Complete W/ Cont if Necessary Per Protocol    D-dimer, Quantitative    Troponin    CK-MB   10. Cardiomyopathy (CMS/HCC)  carvedilol (COREG) 12.5 MG tablet    Stress Test With Myocardial Perfusion One Day    Adult Transthoracic Echo Complete W/ Cont if Necessary Per Protocol   11. Palpitations  Cardiac Event Monitor       Return in about 12 weeks (around 10/29/2019).  CAD/hypertension/hyperlipidemia/shortness of breath/dizziness/smoking/syncope/chest pain/cardiomyopathy/palpitations-patient will have ischemia work-up, stress and echo.  He will continue use nitro ND for chest pain no resolution he will go to the ER.  He will start Ranexa 500 mg twice a day.  He will get a troponin, CK-MB and d-dimer today.   Palpitations-he will wear an event monitor for 2 weeks.  Dizziness-he will have carotid artery ultrasound.  He will continue his medication regimen with the exception of the above added medication.  He will follow-up in 12 weeks or sooner if any changes.  Hypertension-I am increasing his carvedilol to 12.5 mg twice a day.  This will allow better blood pressure control and hopefully decrease some of his angina as well.       Nitin Solis is a current cigarettes user.  He currently smokes and chews;  0.5  pack of cigarettes and 1 can of chew per day for a duration of 20 years. I have educated him on the risk of diseases from using tobacco products such as cancer, COPD and heart diease.     I advised him to quit and he is not willing to quit.        Patient's Body mass index is 36.39 kg/m². BMI is above normal parameters. Recommendations include: educational material.      Electronically signed by:

## 2019-08-07 ENCOUNTER — TELEPHONE (OUTPATIENT)
Dept: CARDIOLOGY | Facility: CLINIC | Age: 44
End: 2019-08-07

## 2019-08-07 NOTE — TELEPHONE ENCOUNTER
Troponin T 0.000 - 0.030 ng/mL <0.010      D-Dimer, Quantitative 0.00 - 0.50 MCGFEU/mL <0.27      CKMB <=10.40 ng/mL 2.26

## 2019-08-12 ENCOUNTER — HOSPITAL ENCOUNTER (OUTPATIENT)
Dept: CARDIOLOGY | Facility: HOSPITAL | Age: 44
Discharge: HOME | End: 2019-08-12

## 2019-08-12 DIAGNOSIS — I42.9 CARDIOMYOPATHY (HCC): ICD-10-CM

## 2019-08-12 DIAGNOSIS — Z72.0 SMOKELESS TOBACCO USE: ICD-10-CM

## 2019-08-12 DIAGNOSIS — R42 DIZZINESS: ICD-10-CM

## 2019-08-12 DIAGNOSIS — R55 SYNCOPE, UNSPECIFIED SYNCOPE TYPE: ICD-10-CM

## 2019-08-12 DIAGNOSIS — I25.119 CORONARY ARTERY DISEASE INVOLVING NATIVE CORONARY ARTERY OF NATIVE HEART WITH ANGINA PECTORIS (HCC): ICD-10-CM

## 2019-08-12 DIAGNOSIS — R42 DIZZINESS: Primary | ICD-10-CM

## 2019-08-12 DIAGNOSIS — R06.02 SHORTNESS OF BREATH: ICD-10-CM

## 2019-08-12 DIAGNOSIS — I10 ESSENTIAL HYPERTENSION: ICD-10-CM

## 2019-08-12 DIAGNOSIS — R07.89 OTHER CHEST PAIN: ICD-10-CM

## 2019-08-12 DIAGNOSIS — E78.5 HYPERLIPIDEMIA, UNSPECIFIED HYPERLIPIDEMIA TYPE: ICD-10-CM

## 2019-08-12 DIAGNOSIS — F17.200 SMOKING: ICD-10-CM

## 2019-08-12 PROCEDURE — A9500 TC99M SESTAMIBI: HCPCS | Performed by: INTERNAL MEDICINE

## 2019-08-12 PROCEDURE — 0 TECHNETIUM SESTAMIBI: Performed by: INTERNAL MEDICINE

## 2019-08-12 PROCEDURE — 93880 EXTRACRANIAL BILAT STUDY: CPT

## 2019-08-12 PROCEDURE — 93306 TTE W/DOPPLER COMPLETE: CPT

## 2019-08-12 PROCEDURE — 93880 EXTRACRANIAL BILAT STUDY: CPT | Performed by: INTERNAL MEDICINE

## 2019-08-12 PROCEDURE — 78452 HT MUSCLE IMAGE SPECT MULT: CPT | Performed by: INTERNAL MEDICINE

## 2019-08-12 PROCEDURE — 93017 CV STRESS TEST TRACING ONLY: CPT

## 2019-08-12 PROCEDURE — 78452 HT MUSCLE IMAGE SPECT MULT: CPT

## 2019-08-12 PROCEDURE — 93306 TTE W/DOPPLER COMPLETE: CPT | Performed by: INTERNAL MEDICINE

## 2019-08-12 PROCEDURE — 93018 CV STRESS TEST I&R ONLY: CPT | Performed by: INTERNAL MEDICINE

## 2019-08-12 PROCEDURE — 25010000002 REGADENOSON 0.4 MG/5ML SOLUTION: Performed by: INTERNAL MEDICINE

## 2019-08-12 RX ADMIN — TECHNETIUM TC 99M SESTAMIBI 1 DOSE: 1 INJECTION INTRAVENOUS at 09:10

## 2019-08-12 RX ADMIN — TECHNETIUM TC 99M SESTAMIBI 1 DOSE: 1 INJECTION INTRAVENOUS at 09:32

## 2019-08-12 RX ADMIN — REGADENOSON 0.4 MG: 0.08 INJECTION, SOLUTION INTRAVENOUS at 09:31

## 2019-08-13 LAB
BH CV STRESS COMMENTS STAGE 1: NORMAL
BH CV STRESS DOSE REGADENOSON STAGE 1: 0.4
BH CV STRESS DURATION MIN STAGE 1: 0
BH CV STRESS DURATION SEC STAGE 1: 10
BH CV STRESS PROTOCOL 1: NORMAL
BH CV STRESS RECOVERY BP: NORMAL MMHG
BH CV STRESS RECOVERY HR: 87 BPM
BH CV STRESS STAGE 1: 1
MAXIMAL PREDICTED HEART RATE: 177 BPM
PERCENT MAX PREDICTED HR: 55.37 %
STRESS BASELINE BP: NORMAL MMHG
STRESS BASELINE HR: 64 BPM
STRESS PERCENT HR: 65 %
STRESS POST PEAK BP: NORMAL MMHG
STRESS POST PEAK HR: 98 BPM
STRESS TARGET HR: 150 BPM

## 2019-08-14 ENCOUNTER — OFFICE VISIT (OUTPATIENT)
Dept: CARDIOLOGY | Facility: CLINIC | Age: 44
End: 2019-08-14

## 2019-08-14 ENCOUNTER — LAB (OUTPATIENT)
Dept: LAB | Facility: HOSPITAL | Age: 44
End: 2019-08-14

## 2019-08-14 VITALS
DIASTOLIC BLOOD PRESSURE: 96 MMHG | SYSTOLIC BLOOD PRESSURE: 150 MMHG | HEART RATE: 103 BPM | BODY MASS INDEX: 35.34 KG/M2 | HEIGHT: 64 IN | OXYGEN SATURATION: 97 % | WEIGHT: 207 LBS

## 2019-08-14 DIAGNOSIS — Z00.00 HEALTHCARE MAINTENANCE: ICD-10-CM

## 2019-08-14 DIAGNOSIS — R06.02 SHORTNESS OF BREATH: ICD-10-CM

## 2019-08-14 DIAGNOSIS — R94.39 ABNORMAL STRESS TEST: ICD-10-CM

## 2019-08-14 DIAGNOSIS — I25.119 CORONARY ARTERY DISEASE INVOLVING NATIVE CORONARY ARTERY OF NATIVE HEART WITH ANGINA PECTORIS (HCC): Primary | ICD-10-CM

## 2019-08-14 DIAGNOSIS — F17.200 SMOKING: ICD-10-CM

## 2019-08-14 DIAGNOSIS — R42 DIZZINESS: ICD-10-CM

## 2019-08-14 DIAGNOSIS — I10 ESSENTIAL HYPERTENSION: ICD-10-CM

## 2019-08-14 DIAGNOSIS — E78.5 HYPERLIPIDEMIA, UNSPECIFIED HYPERLIPIDEMIA TYPE: ICD-10-CM

## 2019-08-14 DIAGNOSIS — R07.89 OTHER CHEST PAIN: ICD-10-CM

## 2019-08-14 DIAGNOSIS — Z72.0 SMOKELESS TOBACCO USE: ICD-10-CM

## 2019-08-14 LAB
ANION GAP SERPL CALCULATED.3IONS-SCNC: 16.8 MMOL/L (ref 5–15)
BUN BLD-MCNC: 11 MG/DL (ref 6–20)
BUN/CREAT SERPL: 10.2 (ref 7–25)
CALCIUM SPEC-SCNC: 9.8 MG/DL (ref 8.6–10.5)
CHLORIDE SERPL-SCNC: 100 MMOL/L (ref 98–107)
CO2 SERPL-SCNC: 22.2 MMOL/L (ref 22–29)
CREAT BLD-MCNC: 1.08 MG/DL (ref 0.76–1.27)
DEPRECATED RDW RBC AUTO: 47.2 FL (ref 37–54)
ERYTHROCYTE [DISTWIDTH] IN BLOOD BY AUTOMATED COUNT: 13.8 % (ref 12.3–15.4)
GFR SERPL CREATININE-BSD FRML MDRD: 75 ML/MIN/1.73
GLUCOSE BLD-MCNC: 84 MG/DL (ref 65–99)
HCT VFR BLD AUTO: 51.9 % (ref 37.5–51)
HGB BLD-MCNC: 17 G/DL (ref 13–17.7)
MCH RBC QN AUTO: 31.4 PG (ref 26.6–33)
MCHC RBC AUTO-ENTMCNC: 32.8 G/DL (ref 31.5–35.7)
MCV RBC AUTO: 95.8 FL (ref 79–97)
PLATELET # BLD AUTO: 194 10*3/MM3 (ref 140–450)
PMV BLD AUTO: 12.4 FL (ref 6–12)
POTASSIUM BLD-SCNC: 4.9 MMOL/L (ref 3.5–5.2)
RBC # BLD AUTO: 5.42 10*6/MM3 (ref 4.14–5.8)
SODIUM BLD-SCNC: 139 MMOL/L (ref 136–145)
WBC NRBC COR # BLD: 13.19 10*3/MM3 (ref 3.4–10.8)

## 2019-08-14 PROCEDURE — 85027 COMPLETE CBC AUTOMATED: CPT | Performed by: NURSE PRACTITIONER

## 2019-08-14 PROCEDURE — 36415 COLL VENOUS BLD VENIPUNCTURE: CPT

## 2019-08-14 PROCEDURE — 99213 OFFICE O/P EST LOW 20 MIN: CPT | Performed by: NURSE PRACTITIONER

## 2019-08-14 PROCEDURE — 80048 BASIC METABOLIC PNL TOTAL CA: CPT | Performed by: NURSE PRACTITIONER

## 2019-08-14 NOTE — PATIENT INSTRUCTIONS
Steps to Quit Smoking    Smoking tobacco can be bad for your health. It can also affect almost every organ in your body. Smoking puts you and people around you at risk for many serious long-lasting (chronic) diseases. Quitting smoking is hard, but it is one of the best things that you can do for your health. It is never too late to quit.  What are the benefits of quitting smoking?  When you quit smoking, you lower your risk for getting serious diseases and conditions. They can include:  · Lung cancer or lung disease.  · Heart disease.  · Stroke.  · Heart attack.  · Not being able to have children (infertility).  · Weak bones (osteoporosis) and broken bones (fractures).  If you have coughing, wheezing, and shortness of breath, those symptoms may get better when you quit. You may also get sick less often. If you are pregnant, quitting smoking can help to lower your chances of having a baby of low birth weight.  What can I do to help me quit smoking?  Talk with your doctor about what can help you quit smoking. Some things you can do (strategies) include:  · Quitting smoking totally, instead of slowly cutting back how much you smoke over a period of time.  · Going to in-person counseling. You are more likely to quit if you go to many counseling sessions.  · Using resources and support systems, such as:  ? Online chats with a counselor.  ? Phone quitlines.  ? Printed self-help materials.  ? Support groups or group counseling.  ? Text messaging programs.  ? Mobile phone apps or applications.  · Taking medicines. Some of these medicines may have nicotine in them. If you are pregnant or breastfeeding, do not take any medicines to quit smoking unless your doctor says it is okay. Talk with your doctor about counseling or other things that can help you.  Talk with your doctor about using more than one strategy at the same time, such as taking medicines while you are also going to in-person counseling. This can help make  quitting easier.  What things can I do to make it easier to quit?  Quitting smoking might feel very hard at first, but there is a lot that you can do to make it easier. Take these steps:  · Talk to your family and friends. Ask them to support and encourage you.  · Call phone quitlines, reach out to support groups, or work with a counselor.  · Ask people who smoke to not smoke around you.  · Avoid places that make you want (trigger) to smoke, such as:  ? Bars.  ? Parties.  ? Smoke-break areas at work.  · Spend time with people who do not smoke.  · Lower the stress in your life. Stress can make you want to smoke. Try these things to help your stress:  ? Getting regular exercise.  ? Deep-breathing exercises.  ? Yoga.  ? Meditating.  ? Doing a body scan. To do this, close your eyes, focus on one area of your body at a time from head to toe, and notice which parts of your body are tense. Try to relax the muscles in those areas.  · Download or buy apps on your mobile phone or tablet that can help you stick to your quit plan. There are many free apps, such as QuitGuide from the CDC (Centers for Disease Control and Prevention). You can find more support from smokefree.gov and other websites.  This information is not intended to replace advice given to you by your health care provider. Make sure you discuss any questions you have with your health care provider.  Document Released: 10/14/2010 Document Revised: 08/15/2017 Document Reviewed: 05/03/2016  [email or web address] Interactive Patient Education © 2019 [email or web address]  Fat and Cholesterol Restricted Eating Plan  Getting too much fat and cholesterol in your diet may cause health problems. Choosing the right foods helps keep your fat and cholesterol at normal levels. This can keep you from getting certain diseases.  Your doctor may recommend an eating plan that includes:  · Total fat: ______% or less of total calories a day.  · Saturated fat: ______% or less of total calories a  "day.  · Cholesterol: less than _________mg a day.  · Fiber: ______g a day.  What are tips for following this plan?  General tips    · Work with your doctor to lose weight if you need to.  · Avoid:  ? Foods with added sugar.  ? Fried foods.  ? Foods with partially hydrogenated oils.  · Limit alcohol intake to no more than 1 drink a day for nonpregnant women and 2 drinks a day for men. One drink equals 12 oz of beer, 5 oz of wine, or 1½ oz of hard liquor.  Reading food labels  · Check food labels for:  ? Trans fats.  ? Partially hydrogenated oils.  ? Saturated fat (g) in each serving.  ? Cholesterol (mg) in each serving.  ? Fiber (g) in each serving.  · Choose foods with healthy fats, such as:  ? Monounsaturated fats.  ? Polyunsaturated fats.  ? Omega-3 fats.  · Choose grain products that have whole grains. Look for the word \"whole\" as the first word in the ingredient list.  Cooking  · Cook foods using low-fat methods. These include baking, boiling, grilling, and broiling.  · Eat more home-cooked foods. Eat at restaurants and buffets less often.  · Avoid cooking using saturated fats, such as butter, cream, palm oil, palm kernel oil, and coconut oil.  Meal planning    · At meals, divide your plate into four equal parts:  ? Fill one-half of your plate with vegetables and green salads.  ? Fill one-fourth of your plate with whole grains.  ? Fill one-fourth of your plate with low-fat (lean) protein foods.  · Eat fish that is high in omega-3 fats at least two times a week. This includes mackerel, tuna, sardines, and salmon.  · Eat foods that are high in fiber, such as whole grains, beans, apples, broccoli, carrots, peas, and barley.  Recommended foods  Grains  · Whole grains, such as whole wheat or whole grain breads, crackers, cereals, and pasta. Unsweetened oatmeal, bulgur, barley, quinoa, or brown rice. Corn or whole wheat flour tortillas.  Vegetables  · Fresh or frozen vegetables (raw, steamed, roasted, or grilled). " Green salads.  Fruits  · All fresh, canned (in natural juice), or frozen fruits.  Meats and other protein foods  · Ground beef (85% or leaner), grass-fed beef, or beef trimmed of fat. Skinless chicken or turkey. Ground chicken or turkey. Pork trimmed of fat. All fish and seafood. Egg whites. Dried beans, peas, or lentils. Unsalted nuts or seeds. Unsalted canned beans. Nut butters without added sugar or oil.  Dairy  · Low-fat or nonfat dairy products, such as skim or 1% milk, 2% or reduced-fat cheeses, low-fat and fat-free ricotta or cottage cheese, or plain low-fat and nonfat yogurt.  Fats and oils  · Tub margarine without trans fats. Light or reduced-fat mayonnaise and salad dressings. Avocado. Olive, canola, sesame, or safflower oils.  The items listed above may not be a complete list of recommended foods or beverages. Contact your dietitian for more options.  The items listed above may not be a complete list of foods and beverages [you/your child] can eat. Contact a dietitian for more information.  Foods to avoid  Grains  · White bread. White pasta. White rice. Cornbread. Bagels, pastries, and croissants. Crackers and snack foods that contain trans fat and hydrogenated oils.  Vegetables  · Vegetables cooked in cheese, cream, or butter sauce. Fried vegetables.  Fruits  · Canned fruit in heavy syrup. Fruit in cream or butter sauce. Fried fruit.  Meats and other protein foods  · Fatty cuts of meat. Ribs, chicken wings, cerda, sausage, bologna, salami, chitterlings, fatback, hot dogs, bratwurst, and packaged lunch meats. Liver and organ meats. Whole eggs and egg yolks. Chicken and turkey with skin. Fried meat.  Dairy  · Whole or 2% milk, cream, half-and-half, and cream cheese. Whole milk cheeses. Whole-fat or sweetened yogurt. Full-fat cheeses. Nondairy creamers and whipped toppings. Processed cheese, cheese spreads, and cheese curds.  Beverages  · Alcohol. Sugar-sweetened drinks such as sodas, lemonade, and fruit  drinks.  Fats and oils  · Butter, stick margarine, lard, shortening, ghee, or cerda fat. Coconut, palm kernel, and palm oils.  Sweets and desserts  · Corn syrup, sugars, honey, and molasses. Candy. Jam and jelly. Syrup. Sweetened cereals. Cookies, pies, cakes, donuts, muffins, and ice cream.  The items listed above may not be a complete list of foods and beverages to avoid. Contact your dietitian for more information.  The items listed above may not be a complete list of foods and beverages [you/your child] should avoid. Contact a dietitian for more information.  Summary  · Choosing the right foods helps keep your fat and cholesterol at normal levels. This can keep you from getting certain diseases.  · At meals, fill one-half of your plate with vegetables and green salads.  · Eat high-fiber foods, like whole grains, beans, apples, carrots, peas, and barley.  · Limit added sugar, saturated fats, alcohol, and fried foods.  This information is not intended to replace advice given to you by your health care provider. Make sure you discuss any questions you have with your health care provider.  Document Released: 06/18/2013 Document Revised: 09/04/2018 Document Reviewed: 09/04/2018  [employer] Interactive Patient Education © 2019 [employer]  BMI for Adults    Body mass index (BMI) is a number that is calculated from a person's weight and height. BMI may help to estimate how much of a person's weight is composed of fat. BMI can help identify those who may be at higher risk for certain medical problems.  How is BMI used with adults?  BMI is used as a screening tool to identify possible weight problems. It is used to check whether a person is obese, overweight, healthy weight, or underweight.  How is BMI calculated?  BMI measures your weight and compares it to your height. This can be done either in English ([place] or metric measurements. Note that charts are available to help you find your BMI quickly and easily without  "having to do these calculations yourself.  To calculate your BMI in English ([place] measurements, your health care provider will:  1. Measure your weight in pounds (lb).  2. Multiply the number of pounds by 703.  ? For example, for a person who weighs 180 lb, multiply that number by 703, which equals 126,540.  3. Measure your height in inches (in). Then multiply that number by itself to get a measurement called \"inches squared.\"  ? For example, for a person who is 70 in tall, the \"inches squared\" measurement is 70 in x 70 in, which equals 4900 inches squared.  4. Divide the total from Step 2 (number of lb x 703) by the total from Step 3 (inches squared): 126,540 ÷ 4900 = 25.8. This is your BMI.  To calculate your BMI in metric measurements, your health care provider will:  1. Measure your weight in kilograms (kg).  2. Measure your height in meters (m). Then multiply that number by itself to get a measurement called \"meters squared.\"  ? For example, for a person who is 1.75 m tall, the \"meters squared\" measurement is 1.75 m x 1.75 m, which is equal to 3.1 meters squared.  3. Divide the number of kilograms (your weight) by the meters squared number. In this example: 70 ÷ 3.1 = 22.6. This is your BMI.  How is BMI interpreted?  To interpret your results, your health care provider will use BMI charts to identify whether you are underweight, normal weight, overweight, or obese. The following guidelines will be used:  · Underweight: BMI less than 18.5.  · Normal weight: BMI between 18.5 and 24.9.  · Overweight: BMI between 25 and 29.9.  · Obese: BMI of 30 and above.  Please note:  · Weight includes both fat and muscle, so someone with a muscular build, such as an athlete, may have a BMI that is higher than 24.9. In cases like these, BMI is not an accurate measure of body fat.  · To determine if excess body fat is the cause of a BMI of 25 or higher, further assessments may need to be done by a health care provider.  · BMI is " usually interpreted in the same way for men and women.  Why is BMI a useful tool?  BMI is useful in two ways:  · Identifying a weight problem that may be related to a medical condition, or that may increase the risk for medical problems.  · Promoting lifestyle and diet changes in order to reach a healthy weight.  Summary  · Body mass index (BMI) is a number that is calculated from a person's weight and height.  · BMI may help to estimate how much of a person's weight is composed of fat. BMI can help identify those who may be at higher risk for certain medical problems.  · BMI can be measured using English measurements or metric measurements.  · To interpret your results, your health care provider will use BMI charts to identify whether you are underweight, normal weight, overweight, or obese.  This information is not intended to replace advice given to you by your health care provider. Make sure you discuss any questions you have with your health care provider.  Document Released: 08/29/2005 Document Revised: 10/31/2018 Doc  Coronary Angiogram With Stent  Coronary angiogram with stent placement is a procedure to widen or open a narrow blood vessel of the heart (coronary artery). Arteries may become blocked by cholesterol buildup (plaques) in the lining of the wall. When a coronary artery becomes partially blocked, blood flow to that area decreases. This may lead to chest pain or a heart attack (myocardial infarction).  A stent is a small piece of metal that looks like mesh or a spring. Stent placement may be done as treatment for a heart attack or right after a coronary angiogram in which a blocked artery is found.  Let your health care provider know about:  · Any allergies you have.  · All medicines you are taking, including vitamins, herbs, eye drops, creams, and over-the-counter medicines.  · Any problems you or family members have had with anesthetic medicines.  · Any blood disorders you have.  · Any surgeries  you have had.  · Any medical conditions you have.  · Whether you are pregnant or may be pregnant.  What are the risks?  Generally, this is a safe procedure. However, problems may occur, including:  · Damage to the heart or its blood vessels.  · A return of blockage.  · Bleeding, infection, or bruising at the insertion site.  · A collection of blood under the skin (hematoma) at the insertion site.  · A blood clot in another part of the body.  · Kidney injury.  · Allergic reaction to the dye or contrast that is used.  · Bleeding into the abdomen (retroperitoneal bleeding).    What happens before the procedure?  Staying hydrated  Follow instructions from your health care provider about hydration, which may include:  · Up to 2 hours before the procedure - you may continue to drink clear liquids, such as water, clear fruit juice, black coffee, and plain tea.    Eating and drinking restrictions  Follow instructions from your health care provider about eating and drinking, which may include:  · 8 hours before the procedure - stop eating heavy meals or foods such as meat, fried foods, or fatty foods.  · 6 hours before the procedure - stop eating light meals or foods, such as toast or cereal.  · 2 hours before the procedure - stop drinking clear liquids.    Ask your health care provider about:  · Changing or stopping your regular medicines. This is especially important if you are taking diabetes medicines or blood thinners.  · Taking medicines such as ibuprofen. These medicines can thin your blood. Do not take these medicines before your procedure if your health care provider instructs you not to. Generally, aspirin is recommended before a procedure of passing a small, thin tube (catheter) through a blood vessel and into the heart (cardiac catheterization).    What happens during the procedure?  · An IV tube will be inserted into one of your veins.  · You will be given one or more of the following:  ? A medicine to help you  relax (sedative).  ? A medicine to numb the area where the catheter will be inserted into an artery (local anesthetic).  · To reduce your risk of infection:  ? Your health care team will wash or sanitize their hands.  ? Your skin will be washed with soap.  ? Hair may be removed from the area where the catheter will be inserted.  · Using a guide wire, the catheter will be inserted into an artery. The location may be in your groin, in your wrist, or in the fold of your arm (near your elbow).  · A type of X-ray (fluoroscopy) will be used to help guide the catheter to the opening of the arteries in the heart.  · A dye will be injected into the catheter, and X-rays will be taken. The dye will help to show where any narrowing or blockages are located in the arteries.  · A tiny wire will be guided to the blocked spot, and a balloon will be inflated to make the artery wider.  · The stent will be expanded and will crush the plaques into the wall of the vessel. The stent will hold the area open and improve the blood flow. Most stents have a drug coating to reduce the risk of the stent narrowing over time.  · The artery may be made wider using a drill, laser, or other tools to remove plaques.  · When the blood flow is better, the catheter will be removed. The lining of the artery will grow over the stent, which stays where it was placed.  This procedure may vary among health care providers and hospitals.  What happens after the procedure?  · If the procedure is done through the leg, you will be kept in bed lying flat for about 6 hours. You will be instructed to not bend and not cross your legs.  · The insertion site will be checked frequently.  · The pulse in your foot or wrist will be checked frequently.  · You may have additional blood tests, X-rays, and a test that records the electrical activity of your heart (electrocardiogram, or ECG).  This information is not intended to replace advice given to you by your health care  provider. Make sure you discuss any questions you have with your health care provider.  Document Released: 06/23/2004 Document Revised: 08/17/2017 Document Reviewed: 07/23/2017  [employer] Interactive Patient Education © 2019 [employer]  ument Reviewed: 10/31/2018  [employer] Interactive Patient Education © 2019 [employer]

## 2019-08-15 ENCOUNTER — APPOINTMENT (OUTPATIENT)
Dept: CARDIOLOGY | Facility: HOSPITAL | Age: 44
End: 2019-08-15

## 2019-08-15 ENCOUNTER — TELEPHONE (OUTPATIENT)
Dept: CARDIOLOGY | Facility: CLINIC | Age: 44
End: 2019-08-15

## 2019-08-15 NOTE — TELEPHONE ENCOUNTER
WBC  3.40 - 10.80 10*3/mm3 13.19 Abnormally high   11.1 R 7.6 R       First attempt to reach pt. Voicemail not set up.

## 2019-08-15 NOTE — TELEPHONE ENCOUNTER
----- Message from JEFF Severino sent at 8/14/2019  6:46 PM EDT -----  Can you call Nitin and find out if he had any recent infections.  His white blood cells are slightly elevated.  Has he been on any steroids?

## 2019-08-16 NOTE — TELEPHONE ENCOUNTER
Informed pt of his results and the message from Chana. Pt denies any recent infections or being on any steroids. States that he feels fine. I informed pt that if he starts to feel poorly that he may want to go in, he verbalized understanding.

## 2019-08-19 ENCOUNTER — TELEPHONE (OUTPATIENT)
Dept: CARDIOLOGY | Facility: CLINIC | Age: 44
End: 2019-08-19

## 2019-08-19 NOTE — TELEPHONE ENCOUNTER
Received fax from [employer] stating that they haven't received any transmissions on pt for several days. Stated they have been unable to reach pt and to have him call their monitoring center at [phone].       First attempt to reach pt. Unable to leave a message, as voicemail isn't set up.

## 2019-08-20 NOTE — TELEPHONE ENCOUNTER
Pt returned call and stated that since he had the heart cath scheduled he did not think he needed the monitor and sent it back to [employer].

## 2019-08-22 LAB
BH CV ECHO MEAS - ACS: 1.9 CM
BH CV ECHO MEAS - AO MEAN PG: 4.3 MMHG
BH CV ECHO MEAS - AO ROOT AREA: 7.4 CM^2
BH CV ECHO MEAS - AO ROOT DIAM: 3.1 CM
BH CV ECHO MEAS - AO V2 MEAN: 98.1 CM/SEC
BH CV ECHO MEAS - AO V2 VTI: 28.8 CM
BH CV ECHO MEAS - BSA(HAYCOCK): 2.1 M^2
BH CV ECHO MEAS - BSA: 2 M^2
BH CV ECHO MEAS - BZI_BMI: 36.4 KILOGRAMS/M^2
BH CV ECHO MEAS - BZI_METRIC_HEIGHT: 162.6 CM
BH CV ECHO MEAS - BZI_METRIC_WEIGHT: 96.2 KG
BH CV ECHO MEAS - EDV(CUBED): 116.6 ML
BH CV ECHO MEAS - EDV(MOD-SP4): 105 ML
BH CV ECHO MEAS - EDV(TEICH): 112 ML
BH CV ECHO MEAS - EF(CUBED): 41.4 %
BH CV ECHO MEAS - EF(MOD-SP4): 48.6 %
BH CV ECHO MEAS - EF(TEICH): 34.2 %
BH CV ECHO MEAS - ESV(CUBED): 68.3 ML
BH CV ECHO MEAS - ESV(MOD-SP4): 54 ML
BH CV ECHO MEAS - ESV(TEICH): 73.7 ML
BH CV ECHO MEAS - FS: 16.3 %
BH CV ECHO MEAS - IVS/LVPW: 1.1
BH CV ECHO MEAS - IVSD: 1.5 CM
BH CV ECHO MEAS - LA DIMENSION: 3.7 CM
BH CV ECHO MEAS - LA/AO: 1.2
BH CV ECHO MEAS - LV IVRT: 0.1 SEC
BH CV ECHO MEAS - LV MASS(C)D: 282.3 GRAMS
BH CV ECHO MEAS - LVIDD: 4.9 CM
BH CV ECHO MEAS - LVIDS: 4.1 CM
BH CV ECHO MEAS - LVLD AP4: 8.6 CM
BH CV ECHO MEAS - LVLS AP4: 7.8 CM
BH CV ECHO MEAS - LVOT AREA (M): 3.8 CM^2
BH CV ECHO MEAS - LVOT AREA: 3.7 CM^2
BH CV ECHO MEAS - LVOT DIAM: 2.2 CM
BH CV ECHO MEAS - LVPWD: 1.3 CM
BH CV ECHO MEAS - MV A MAX VEL: 67.1 CM/SEC
BH CV ECHO MEAS - MV DEC SLOPE: 309.4 CM/SEC^2
BH CV ECHO MEAS - MV E MAX VEL: 72.6 CM/SEC
BH CV ECHO MEAS - MV E/A: 1.1
BH CV ECHO MEAS - RVDD: 2.8 CM
BH CV ECHO MEAS - SV(AO): 211.6 ML
BH CV ECHO MEAS - SV(CUBED): 48.2 ML
BH CV ECHO MEAS - SV(MOD-SP4): 51 ML
BH CV ECHO MEAS - SV(TEICH): 38.3 ML
BH CV XLRA MEAS LEFT BULB EDV: -18.1 CM/SEC
BH CV XLRA MEAS LEFT BULB PSV: -53.1 CM/SEC
BH CV XLRA MEAS LEFT CCA RATIO VEL: 71.2 CM/SEC
BH CV XLRA MEAS LEFT DIST CCA EDV: 19.9 CM/SEC
BH CV XLRA MEAS LEFT DIST CCA PSV: 71.2 CM/SEC
BH CV XLRA MEAS LEFT DIST ICA EDV: -29.6 CM/SEC
BH CV XLRA MEAS LEFT DIST ICA PSV: -84.4 CM/SEC
BH CV XLRA MEAS LEFT ICA RATIO VEL: -84.4 CM/SEC
BH CV XLRA MEAS LEFT ICA/CCA RATIO: -1.2
BH CV XLRA MEAS LEFT MID CCA EDV: 22.9 CM/SEC
BH CV XLRA MEAS LEFT MID CCA PSV: 106 CM/SEC
BH CV XLRA MEAS LEFT MID ICA EDV: -28.9 CM/SEC
BH CV XLRA MEAS LEFT MID ICA PSV: -68.8 CM/SEC
BH CV XLRA MEAS LEFT PROX CCA EDV: 30.2 CM/SEC
BH CV XLRA MEAS LEFT PROX CCA PSV: 116 CM/SEC
BH CV XLRA MEAS LEFT PROX ECA EDV: -14.5 CM/SEC
BH CV XLRA MEAS LEFT PROX ECA PSV: -73 CM/SEC
BH CV XLRA MEAS LEFT PROX ICA EDV: -21.1 CM/SEC
BH CV XLRA MEAS LEFT PROX ICA PSV: -70 CM/SEC
BH CV XLRA MEAS LEFT VERTEBRAL A EDV: 15.6 CM/SEC
BH CV XLRA MEAS LEFT VERTEBRAL A PSV: 43 CM/SEC
BH CV XLRA MEAS RIGHT BULB EDV: -10.3 CM/SEC
BH CV XLRA MEAS RIGHT BULB PSV: -30.5 CM/SEC
BH CV XLRA MEAS RIGHT CCA RATIO VEL: 83.2 CM/SEC
BH CV XLRA MEAS RIGHT DIST CCA EDV: 17.9 CM/SEC
BH CV XLRA MEAS RIGHT DIST CCA PSV: 83.2 CM/SEC
BH CV XLRA MEAS RIGHT DIST ICA EDV: -47.4 CM/SEC
BH CV XLRA MEAS RIGHT DIST ICA PSV: -90.4 CM/SEC
BH CV XLRA MEAS RIGHT ICA RATIO VEL: -106 CM/SEC
BH CV XLRA MEAS RIGHT ICA/CCA RATIO: -1.3
BH CV XLRA MEAS RIGHT MID CCA EDV: 24.9 CM/SEC
BH CV XLRA MEAS RIGHT MID CCA PSV: 116 CM/SEC
BH CV XLRA MEAS RIGHT MID ICA EDV: -28.3 CM/SEC
BH CV XLRA MEAS RIGHT MID ICA PSV: -61.5 CM/SEC
BH CV XLRA MEAS RIGHT PROX CCA EDV: 12.9 CM/SEC
BH CV XLRA MEAS RIGHT PROX CCA PSV: 90.2 CM/SEC
BH CV XLRA MEAS RIGHT PROX ECA EDV: -13.8 CM/SEC
BH CV XLRA MEAS RIGHT PROX ECA PSV: -69.2 CM/SEC
BH CV XLRA MEAS RIGHT PROX ICA EDV: -44 CM/SEC
BH CV XLRA MEAS RIGHT PROX ICA PSV: -106 CM/SEC
BH CV XLRA MEAS RIGHT VERTEBRAL A EDV: 13.9 CM/SEC
BH CV XLRA MEAS RIGHT VERTEBRAL A PSV: 45.8 CM/SEC
MAXIMAL PREDICTED HEART RATE: 177 BPM
STRESS TARGET HR: 150 BPM

## 2019-08-27 ENCOUNTER — OFFICE VISIT (OUTPATIENT)
Dept: CARDIOLOGY | Facility: CLINIC | Age: 44
End: 2019-08-27

## 2019-08-27 ENCOUNTER — TELEPHONE (OUTPATIENT)
Dept: CARDIOLOGY | Facility: CLINIC | Age: 44
End: 2019-08-27

## 2019-08-27 VITALS
HEART RATE: 90 BPM | HEIGHT: 64 IN | OXYGEN SATURATION: 99 % | BODY MASS INDEX: 35.13 KG/M2 | WEIGHT: 205.8 LBS | SYSTOLIC BLOOD PRESSURE: 142 MMHG | DIASTOLIC BLOOD PRESSURE: 91 MMHG

## 2019-08-27 DIAGNOSIS — R00.2 PALPITATIONS: ICD-10-CM

## 2019-08-27 DIAGNOSIS — Z72.0 SMOKELESS TOBACCO USE: ICD-10-CM

## 2019-08-27 DIAGNOSIS — I50.9 CONGESTIVE HEART FAILURE, UNSPECIFIED HF CHRONICITY, UNSPECIFIED HEART FAILURE TYPE (HCC): ICD-10-CM

## 2019-08-27 DIAGNOSIS — F17.200 SMOKING: ICD-10-CM

## 2019-08-27 DIAGNOSIS — I10 ESSENTIAL HYPERTENSION: ICD-10-CM

## 2019-08-27 DIAGNOSIS — R60.9 PERIPHERAL EDEMA: ICD-10-CM

## 2019-08-27 DIAGNOSIS — E78.5 HYPERLIPIDEMIA, UNSPECIFIED HYPERLIPIDEMIA TYPE: ICD-10-CM

## 2019-08-27 DIAGNOSIS — I25.5 ISCHEMIC CARDIOMYOPATHY: ICD-10-CM

## 2019-08-27 DIAGNOSIS — R06.02 SHORTNESS OF BREATH: ICD-10-CM

## 2019-08-27 DIAGNOSIS — I25.119 CORONARY ARTERY DISEASE INVOLVING NATIVE CORONARY ARTERY OF NATIVE HEART WITH ANGINA PECTORIS (HCC): ICD-10-CM

## 2019-08-27 DIAGNOSIS — R07.89 OTHER CHEST PAIN: Primary | ICD-10-CM

## 2019-08-27 PROBLEM — I42.0 DILATED CARDIOMYOPATHY (HCC): Status: ACTIVE | Noted: 2019-08-27

## 2019-08-27 PROCEDURE — 99214 OFFICE O/P EST MOD 30 MIN: CPT | Performed by: NURSE PRACTITIONER

## 2019-08-27 NOTE — TELEPHONE ENCOUNTER
----- Message from JEFF Severino sent at 8/23/2019  7:32 AM EDT -----  Advise patient, TX ASA and statin.

## 2019-08-27 NOTE — TELEPHONE ENCOUNTER
Duplex Carotid:  1.  Both common carotid arteries, the bulb and bifurcation are widely patent bilaterally with no significant atherosclerotic involvement by echo or Doppler sampling.     2.  16 to 49% stenosis by Doppler in the mid and distal right internal carotid artery and distal left internal carotid artery.     3.  Antegrade flow in both vertebral arteries.     Summary: Minimal nonobstructive carotid atherosclerosis as above.  Antegrade flow in both vertebral arteries.    Per chart review, pt saw Chana today for appt.

## 2019-08-27 NOTE — PATIENT INSTRUCTIONS
"Fat and Cholesterol Restricted Eating Plan  Getting too much fat and cholesterol in your diet may cause health problems. Choosing the right foods helps keep your fat and cholesterol at normal levels. This can keep you from getting certain diseases.  Your doctor may recommend an eating plan that includes:  · Total fat: ______% or less of total calories a day.  · Saturated fat: ______% or less of total calories a day.  · Cholesterol: less than _________mg a day.  · Fiber: ______g a day.  What are tips for following this plan?  General tips    · Work with your doctor to lose weight if you need to.  · Avoid:  ? Foods with added sugar.  ? Fried foods.  ? Foods with partially hydrogenated oils.  · Limit alcohol intake to no more than 1 drink a day for nonpregnant women and 2 drinks a day for men. One drink equals 12 oz of beer, 5 oz of wine, or 1½ oz of hard liquor.  Reading food labels  · Check food labels for:  ? Trans fats.  ? Partially hydrogenated oils.  ? Saturated fat (g) in each serving.  ? Cholesterol (mg) in each serving.  ? Fiber (g) in each serving.  · Choose foods with healthy fats, such as:  ? Monounsaturated fats.  ? Polyunsaturated fats.  ? Omega-3 fats.  · Choose grain products that have whole grains. Look for the word \"whole\" as the first word in the ingredient list.  Cooking  · Cook foods using low-fat methods. These include baking, boiling, grilling, and broiling.  · Eat more home-cooked foods. Eat at restaurants and buffets less often.  · Avoid cooking using saturated fats, such as butter, cream, palm oil, palm kernel oil, and coconut oil.  Meal planning    · At meals, divide your plate into four equal parts:  ? Fill one-half of your plate with vegetables and green salads.  ? Fill one-fourth of your plate with whole grains.  ? Fill one-fourth of your plate with low-fat (lean) protein foods.  · Eat fish that is high in omega-3 fats at least two times a week. This includes mackerel, tuna, sardines, and " salmon.  · Eat foods that are high in fiber, such as whole grains, beans, apples, broccoli, carrots, peas, and barley.  Recommended foods  Grains  · Whole grains, such as whole wheat or whole grain breads, crackers, cereals, and pasta. Unsweetened oatmeal, bulgur, barley, quinoa, or brown rice. Corn or whole wheat flour tortillas.  Vegetables  · Fresh or frozen vegetables (raw, steamed, roasted, or grilled). Green salads.  Fruits  · All fresh, canned (in natural juice), or frozen fruits.  Meats and other protein foods  · Ground beef (85% or leaner), grass-fed beef, or beef trimmed of fat. Skinless chicken or turkey. Ground chicken or turkey. Pork trimmed of fat. All fish and seafood. Egg whites. Dried beans, peas, or lentils. Unsalted nuts or seeds. Unsalted canned beans. Nut butters without added sugar or oil.  Dairy  · Low-fat or nonfat dairy products, such as skim or 1% milk, 2% or reduced-fat cheeses, low-fat and fat-free ricotta or cottage cheese, or plain low-fat and nonfat yogurt.  Fats and oils  · Tub margarine without trans fats. Light or reduced-fat mayonnaise and salad dressings. Avocado. Olive, canola, sesame, or safflower oils.  The items listed above may not be a complete list of recommended foods or beverages. Contact your dietitian for more options.  The items listed above may not be a complete list of foods and beverages [you/your child] can eat. Contact a dietitian for more information.  Foods to avoid  Grains  · White bread. White pasta. White rice. Cornbread. Bagels, pastries, and croissants. Crackers and snack foods that contain trans fat and hydrogenated oils.  Vegetables  · Vegetables cooked in cheese, cream, or butter sauce. Fried vegetables.  Fruits  · Canned fruit in heavy syrup. Fruit in cream or butter sauce. Fried fruit.  Meats and other protein foods  · Fatty cuts of meat. Ribs, chicken wings, cerda, sausage, bologna, salami, chitterlings, fatback, hot dogs, bratwurst, and packaged  lunch meats. Liver and organ meats. Whole eggs and egg yolks. Chicken and turkey with skin. Fried meat.  Dairy  · Whole or 2% milk, cream, half-and-half, and cream cheese. Whole milk cheeses. Whole-fat or sweetened yogurt. Full-fat cheeses. Nondairy creamers and whipped toppings. Processed cheese, cheese spreads, and cheese curds.  Beverages  · Alcohol. Sugar-sweetened drinks such as sodas, lemonade, and fruit drinks.  Fats and oils  · Butter, stick margarine, lard, shortening, ghee, or cerda fat. Coconut, palm kernel, and palm oils.  Sweets and desserts  · Corn syrup, sugars, honey, and molasses. Candy. Jam and jelly. Syrup. Sweetened cereals. Cookies, pies, cakes, donuts, muffins, and ice cream.  The items listed above may not be a complete list of foods and beverages to avoid. Contact your dietitian for more information.  The items listed above may not be a complete list of foods and beverages [you/your child] should avoid. Contact a dietitian for more information.  Summary  · Choosing the right foods helps keep your fat and cholesterol at normal levels. This can keep you from getting certain diseases.  · At meals, fill one-half of your plate with vegetables and green salads.  · Eat high-fiber foods, like whole grains, beans, apples, carrots, peas, and barley.  · Limit added sugar, saturated fats, alcohol, and fried foods.  This information is not intended to replace advice given to you by your health care provider. Make sure you discuss any questions you have with your health care provider.  Document Released: 06/18/2013 Document Revised: 09/04/2018 Document Reviewed: 09/04/2018  [employer] Interactive Patient Education © 2019 [employer]  BMI for Adults    Body mass index (BMI) is a number that is calculated from a person's weight and height. BMI may help to estimate how much of a person's weight is composed of fat. BMI can help identify those who may be at higher risk for certain medical problems.  How is BMI  "used with adults?  BMI is used as a screening tool to identify possible weight problems. It is used to check whether a person is obese, overweight, healthy weight, or underweight.  How is BMI calculated?  BMI measures your weight and compares it to your height. This can be done either in English ([place] or metric measurements. Note that charts are available to help you find your BMI quickly and easily without having to do these calculations yourself.  To calculate your BMI in English ([place] measurements, your health care provider will:  1. Measure your weight in pounds (lb).  2. Multiply the number of pounds by 703.  ? For example, for a person who weighs 180 lb, multiply that number by 703, which equals 126,540.  3. Measure your height in inches (in). Then multiply that number by itself to get a measurement called \"inches squared.\"  ? For example, for a person who is 70 in tall, the \"inches squared\" measurement is 70 in x 70 in, which equals 4900 inches squared.  4. Divide the total from Step 2 (number of lb x 703) by the total from Step 3 (inches squared): 126,540 ÷ 4900 = 25.8. This is your BMI.  To calculate your BMI in metric measurements, your health care provider will:  1. Measure your weight in kilograms (kg).  2. Measure your height in meters (m). Then multiply that number by itself to get a measurement called \"meters squared.\"  ? For example, for a person who is 1.75 m tall, the \"meters squared\" measurement is 1.75 m x 1.75 m, which is equal to 3.1 meters squared.  3. Divide the number of kilograms (your weight) by the meters squared number. In this example: 70 ÷ 3.1 = 22.6. This is your BMI.  How is BMI interpreted?  To interpret your results, your health care provider will use BMI charts to identify whether you are underweight, normal weight, overweight, or obese. The following guidelines will be used:  · Underweight: BMI less than 18.5.  · Normal weight: BMI between 18.5 and 24.9.  · Overweight: BMI " between 25 and 29.9.  · Obese: BMI of 30 and above.  Please note:  · Weight includes both fat and muscle, so someone with a muscular build, such as an athlete, may have a BMI that is higher than 24.9. In cases like these, BMI is not an accurate measure of body fat.  · To determine if excess body fat is the cause of a BMI of 25 or higher, further assessments may need to be done by a health care provider.  · BMI is usually interpreted in the same way for men and women.  Why is BMI a useful tool?  BMI is useful in two ways:  · Identifying a weight problem that may be related to a medical condition, or that may increase the risk for medical problems.  · Promoting lifestyle and diet changes in order to reach a healthy weight.  Summary  · Body mass index (BMI) is a number that is calculated from a person's weight and height.  · BMI may help to estimate how much of a person's weight is composed of fat. BMI can help identify those who may be at higher risk for certain medical problems.  · BMI can be measured using English measurements or metric measurements.  · To interpret your results, your health care provider will use BMI charts to identify whether you are underweight, normal weight, overweight, or obese.  This information is not intended to replace advice given to you by your health care provider. Make sure you discuss any questions you have with your health care provider.  Document Released: 08/29/2005 Document Revised: 10/31/2018 Document Reviewed: 10/31/2018  [employer] Interactive Patient Education © 2019 [employer]    Smokeless Tobacco Information, Adult  Tobacco use is one of the leading causes of cancer and other chronic health problems. Smokeless tobacco is tobacco that is put directly into the mouth instead of being smoked. It may also be called chewing tobacco or snuff. Smokeless tobacco is made from the leaves of tobacco plants and it comes in several forms:  · Loose, dry leaves, plugs, or twists.  · Moist  pouches.  · Dissolving lozenges or strips.  Chewing, sucking, or holding the tobacco in your mouth causes your mouth to make more saliva. The saliva mixes with the tobacco to make “tobacco juice” that is swallowed or spit out.  How can smokeless tobacco affect me?  Using smokeless tobacco:  · Increases your risk of developing cancer. Smokeless tobacco contains at least 28 different types of cancer-causing chemicals (carcinogens).  · Increases your chances of developing other long-term health problems, including high blood pressure, heart disease, stroke, and dental problems.  · Can make you become addicted. Nicotine is one of the chemicals in tobacco. When you chew tobacco, you absorb nicotine from the tobacco juice. This can make you feel more alert than usual.  · Can cause problems with pregnancy. Pregnant women who use smokeless tobacco are more likely to miscarry or deliver a baby too early (premature delivery).  · Can affect the appearance and health of your mouth. Using smokeless tobacco may cause bad breath, yellow-brown teeth, mouth sores, cracking and bleeding lips, gum recession, and lesions on the soft tissues of your mouth (leukoplakia).  What are the benefits of not using smokeless tobacco?  The benefits of not using smokeless tobacco include:  · A healthy mind because:  ? You avoid addiction.  · A healthy body because:  ? You avoid dental problems.  ? You promote healthy pregnancy.  ? You avoid long-term health problems.  · A healthy wallet because:  ? You avoid costs of buying tobacco.  ? You avoid health care costs in the future.  · A healthy family because:  ? You avoid accidental poisoning of children in your household.  What can happen if I continue to use smokeless tobacco?  If you continue to use smokeless tobacco, you will increase your risk for developing certain cancers. These include:  · Tongue.  · Lips, mouth, and gums.  · Throat (esophagus) and voice box  (larynx).  · Stomach.  · Pancreas.  · Bladder.  · Colon.  Long-term use of smokeless tobacco can also lead to:  · High blood pressure, heart disease, and stroke.  · Gum disease, gum recession, and bone loss around the teeth.  · Tooth decay.  How do I quit using smokeless tobacco?  Quitting the use of smokeless tobacco can be hard, but it can be done. Follow these steps:  · Pick a date to quit. Set a date within the next two weeks. This gives you time to prepare.  · Write down the reasons why you are quitting. Keep this list in places where you will see it often, such as on your bathroom mirror or in your car or wallet.  · Identify the people, places, things, and activities that make you want to use tobacco (triggers) and avoid them.  · Get rid of any tobacco you have and remove any tobacco smells. To do this:  ? Throw away all containers of tobacco at home, at work, and in your car.  ? Throw away any other items that you use regularly when you chew tobacco.  ? Clean your car and make sure to remove all tobacco-related items.  ? Clean your home, including curtains and carpets.  · Tell your family, friends, and coworkers that you are quitting. This can make quitting easier.  · Ask your health care provider for help quitting smokeless tobacco. This may involve treatment. Find out what treatment options are covered by your health insurance.  · Keep track of how many days have passed since you quit. Remembering how long and hard you have worked to quit can help you avoid using tobacco again.  Where can I get support?  Ask your health care provider if there is a local support group for quitting smokeless tobacco.  Where can I get more information?  You can learn more about the risks of using smokeless tobacco and the benefits of quitting from these sources:  · National Cancer [place]: www.cancer.gov  · American Cancer Society: www.cancer.org  When should I seek medical care?  Seek medical care if you have:  · White or  other discolored patches in your mouth.  · Difficulty swallowing.  · A change in your voice.  · Unexplained weight loss.  · Stomach pain, nausea, or vomiting.  Summary  · Smokeless tobacco contains at least 28 different chemicals that are known to cause cancer (carcinogen).  · Nicotine is an addictive chemical in smokeless tobacco.  · When you quit using smokeless tobacco, you lower your risk of developing cancer.  This information is not intended to replace advice given to you by your health care provider. Make sure you discuss any questions you have with your health care provider.  Document Released: 05/21/2012 Document Revised: 08/03/2018 Document Reviewed: 07/29/2016  [employer] Interactive Patient Education © 2019 [employer]    Steps to Quit Smoking    Smoking tobacco can be bad for your health. It can also affect almost every organ in your body. Smoking puts you and people around you at risk for many serious long-lasting (chronic) diseases. Quitting smoking is hard, but it is one of the best things that you can do for your health. It is never too late to quit.  What are the benefits of quitting smoking?  When you quit smoking, you lower your risk for getting serious diseases and conditions. They can include:  · Lung cancer or lung disease.  · Heart disease.  · Stroke.  · Heart attack.  · Not being able to have children (infertility).  · Weak bones (osteoporosis) and broken bones (fractures).  If you have coughing, wheezing, and shortness of breath, those symptoms may get better when you quit. You may also get sick less often. If you are pregnant, quitting smoking can help to lower your chances of having a baby of low birth weight.  What can I do to help me quit smoking?  Talk with your doctor about what can help you quit smoking. Some things you can do (strategies) include:  · Quitting smoking totally, instead of slowly cutting back how much you smoke over a period of time.  · Going to in-person counseling.  You are more likely to quit if you go to many counseling sessions.  · Using resources and support systems, such as:  ? Online chats with a counselor.  ? Phone quitlines.  ? Printed self-help materials.  ? Support groups or group counseling.  ? Text messaging programs.  ? Mobile phone apps or applications.  · Taking medicines. Some of these medicines may have nicotine in them. If you are pregnant or breastfeeding, do not take any medicines to quit smoking unless your doctor says it is okay. Talk with your doctor about counseling or other things that can help you.  Talk with your doctor about using more than one strategy at the same time, such as taking medicines while you are also going to in-person counseling. This can help make quitting easier.  What things can I do to make it easier to quit?  Quitting smoking might feel very hard at first, but there is a lot that you can do to make it easier. Take these steps:  · Talk to your family and friends. Ask them to support and encourage you.  · Call phone quitlines, reach out to support groups, or work with a counselor.  · Ask people who smoke to not smoke around you.  · Avoid places that make you want (trigger) to smoke, such as:  ? Bars.  ? Parties.  ? Smoke-break areas at work.  · Spend time with people who do not smoke.  · Lower the stress in your life. Stress can make you want to smoke. Try these things to help your stress:  ? Getting regular exercise.  ? Deep-breathing exercises.  ? Yoga.  ? Meditating.  ? Doing a body scan. To do this, close your eyes, focus on one area of your body at a time from head to toe, and notice which parts of your body are tense. Try to relax the muscles in those areas.  · Download or buy apps on your mobile phone or tablet that can help you stick to your quit plan. There are many free apps, such as QuitGuide from the CDC (Centers for Disease Control and Prevention). You can find more support from smokefree.gov and other websites.  This  information is not intended to replace advice given to you by your health care provider. Make sure you discuss any questions you have with your health care provider.  Document Released: 10/14/2010 Document Revised: 08/15/2017 Document Reviewed: 05/03/2016  [employer] Interactive Patient Education © 2019 [employer]    Cardiomyopathy, Adult    Cardiomyopathy is a long-term (chronic) disease of the heart muscle. The disease makes the heart muscle thick, weak, or stiff. As a result, the heart works harder to pump blood. Over time cardiomyopathy can lead to an irregular heartbeat (arrhythmia) and heart failure.  There are several types of cardiomyopathy:  · Dilated cardiomyopathy. This type causes the ventricles to become weak and stretched out.  · Hypertrophic cardiomyopathy. This type causes the heart muscle to thicken.  · Restrictive cardiomyopathy. This type causes the heart muscle to become stiff.  · Ischemic cardiomyopathy. This type involves narrowing arteries that cause the walls of the heart to get thinner.  · Peripartum cardiomyopathy. This type occurs during pregnancy or shortly after pregnancy.  What are the causes?  This condition may be caused by:  · A gene that is passed down (inherited) from a family member.  · A medical condition that damages the heart, such as:  ? Diabetes.  ? High blood pressure.  ? Viral infection of the heart.  ? Heart attack.  ? Coronary heart disease.  · Alcoholism.  · Using illegal drugs or some prescription medicines.  · Pregnancy.  · Your body absorbing and storing too much iron (hemochromatosis).  · Autoimmune diseases, connective tissue diseases, endocrine diseases, and muscle diseases.  · Cancer treatments.  · Buildup of proteins in your organs (amyloidosis), or inflammation in your organs (sarcoidosis).  Often, the cause is not known.  What increases the risk?  This condition is more likely to develop in people who:  · Have a family history of cardiomyopathy or other heart  problems.  · Are overweight or obese.  · Use illegal drugs.  · Abuse alcohol.  · Have a medical condition that damages the heart.  What are the signs or symptoms?  Symptoms of this condition include:  · Shortness of breath, especially during activity.  · Fatigue.  · An irregular heartbeat and heart murmurs.  · Dizziness.  · Light-headedness.  · Fainting.  · Chest pain.  · Coughing.  · Swelling in the lower legs, ankles, feet, abdomen, and neck veins.  Often, people with this condition have no symptoms.  How is this diagnosed?  This condition is diagnosed based on:  · Your symptoms and medical history.  · A physical exam.  · Tests.  Tests may include:  · Blood tests.  · Imaging studies of your heart, such as:  ? X-rays.  ? An echocardiogram.  ? An MRI.  · An electrocardiogram (ECG). This records your heart's electrical activity.  · A test in which you wear a portable device (event monitor) to record your heart's electrical activity while you go about your day.  · A stress test. This monitors your heart's activity while exercising.  · Cardiac catheterization. This procedure checks the blood pressure and blood flow in your heart.  · An angiogram. This is an injection of dye into your arteries before imaging studies are taken.  · Heart tissue biopsy. This removes a sample of heart tissue for examination.  How is this treated?  Treatment for this condition depends on the type of cardiomyopathy you have and the severity of your symptoms. If you do not have symptoms, you may not need treatment. If you need treatment, it may include:  · Lifestyle changes, such as:  ? Eating a heart-healthy diet that includes plenty of fruits, vegetables, and whole grains, and cutting down on salt (sodium).  ? Maintaining a healthy weight, and losing weight, if needed.  ? Getting regular exercise.  ? Quitting smoking, if you smoke.  ? Avoiding alcohol.  ? Medicine to:  § Lower your blood pressure.  § Slow down your heart rate.  § Keep your  heart beating in a steady rhythm.  § Clear excess fluids from your body.  § Prevent blood clots.  § Balance minerals (electrolytes) in your body and get rid of extra sodium in your body.  § Reduce inflammation.  § Strengthen your heartbeat.  ? Surgery to:  § Repair a defect.  § Remove thickened tissue.  § Destroy tissues in the area of abnormal electrical activity (ablation).  § Implant a device to treat serious heart rhythm problems (implantable cardioverter-defibrillator, or ICD), or a pacemaker.  § Replace your heart (heart transplant) if all other treatments have failed (end stage).  Other treatments may include cardiac resynchronization therapy (CRT) or a left ventricular assist device (LVAD).  Follow these instructions at home:  Lifestyle    · Eat a heart-healthy diet. Work with your health care provider or a registered dietitian to learn about healthy eating options.  · Maintain a healthy weight.  · Stay physically active. Ask your health care provider to suggest some activities that are good for you.  · Do not use any products that contain nicotine or tobacco, such as cigarettes and e-cigarettes. If you need help quitting, ask your health care provider.  · Limit alcohol intake to no more than one drink per day for nonpregnant women and no more than two drinks per day for men. One drink equals 12 oz of beer, 5 oz of wine, or 1½ oz of hard liquor.  · Try to get at least 7 hours of sleep each night.  · Find healthy ways to manage stress.  General instructions  · Take over-the-counter and prescription medicines only as told by your health care provider. Some medicines can be dangerous for your heart.  · Tell all health care providers, including your dentist, that you have cardiomyopathy. When you visit the dentist or have surgery, ask your health care provider if you need antibiotics before having dental care or before the surgery.  · Ask your health care provider if you should wear a medical identification  bracelet. This may be important if you have a pacemaker or a defibrillator.  · Make sure you get all recommended vaccinations and an annual flu shot.  · Work closely with your health care provider to manage any long-lasting (chronic) conditions.  · Keep all follow-up visits as told by your health care provider. This is important, even if you do not have any symptoms. Your health care provider may need to make sure your condition is not getting worse.  How is this prevented?  This condition cannot be prevented. Parents, siblings, and children of people with this condition may be at risk for the condition. It is a good idea for them to get screened for the condition because it is best when cardiomyopathy is found early. Screening is done with an ECG and echocardiogram.  People who want to start a family may also want to meet with a genetic counselor to discuss the risk of having a child with cardiomyopathy.  Contact a health care provider if:  · Your symptoms get worse.  · You have new symptoms.  Get help right away if:  · You have severe chest pain.  · You have shortness of breath.  · You cough up pink, bubbly material.  · You have sudden sweating.  · You feel nauseous and you vomit.  · You suddenly become light-headed or dizzy.  · You feel your heart beating very quickly.  · It feels like your heart is skipping beats.  These symptoms may represent a serious problem that is an emergency. Do not wait to see if the symptoms will go away. Get medical help right away. Call your local emergency services (911 in the [place] Do not drive yourself to the hospital.  Summary  · Cardiomyopathy is a long-term (chronic) disease of the heart muscle.  · Over time cardiomyopathy can lead to an irregular heartbeat (arrhealth hythmia) and heart failure.  This information is not intended to replace advice given to you by your care provider. Make sure you discuss any questions you have with your health care provider.  Document Released:  03/02/2006 Document Revised: 03/09/2018 Document Reviewed: 06/19/2017  Elsevier Interactive Patient Education © 2019 [employer] Inc[employer]

## 2019-08-27 NOTE — PROGRESS NOTES
Subjective   Nitin Solis is a 43 y.o. male     Chief Complaint   Patient presents with   • Follow-up     Here for a follow up on heart cath        HPI    Problem List:    1. CAD  1.1 Stress Test 6/28/16 - low risk  1.2 [facility] 10/19/16 - obtuse 70%; first septal  60-80%; first diag 50-70%; EF 35%; Stent to LAD  1.3 stress test 8/12/19-diaphragmatic, inferoapical and apical ischemia, post-rest EF 37%  1.4 [facility] 8/19/19 - Lt Main 10-15%; LAD 40%; PDA 90-95% with collateral circulation; EF 35%  2. Shortness of breath  2.1 Echo 6/28/16 - mild LVH; EF 45-50%  2.2 Echo 4/24/17-mild LVH, EF 50-55%, diastolic dysfunction 1, trace MR, trace TR  2.3 Echo 8/12/19 - mild to mod LVH; EF 41-45%; DD I, mild MR  3. Palpitations  3.1 Event Monitor 11/4-11/10/16 -NSR  3.2 Event Monitor 8/13-8/22/19 - NSR - ST   4. Dizziness   5. Hypothyroidism   6. Smokeless tobacco and smoking habituation  7. Ischemic Cardiomyopathy   7.1 MUGA 11/2/16 - EF 44%  7.2 [facility] 8/19/19 - EF 35%  8. CHF class III symptoms   9. Carotid Artery Disease  9.1 Carotid Artery US 8/12/19 - 16-49% mid and distal SHAUN and distal LICA; antegrade flow both vertebral arteries    Patient is a 43-year-old male who presents today for follow-up status post left heart cath.  He continues to have what he describes as left anterior chest tightness and pressure.  He says the Ranexa has helped some.  He says he has not had to take nitro for some time.  He says he has an occasional fluttering.  He denies any dizziness, presyncope, syncope, orthopnea or PND.  He has lightheadedness on and off.  He says that he does get some swelling in his legs.  He is short of breath with rest and with activity.  He says he stays fatigued.  Patient has not taken his medications yet today.  Patient still smokes three quarters of a pack a day and he dips a can a day.    We went over left heart cath, carotid, echo and event monitor.    Current Outpatient Medications   Medication Sig Dispense  Refill   • aspirin 81 MG tablet Take 325 mg by mouth Daily. 30 tablet 11   • atorvastatin (LIPITOR) 40 MG tablet TAKE 1 TABLET BY MOUTH DAILY FOR CHOLESTEROL 30 tablet 5   • carvedilol (COREG) 12.5 MG tablet Take 1 tablet by mouth 2 (Two) Times a Day With Meals. 60 tablet 11   • clopidogrel (PLAVIX) 75 MG tablet TAKE 1 TABLET BY MOUTH DAILY FOR HEART & A BLOOD THINNER 30 tablet 5   • levothyroxine (SYNTHROID, LEVOTHROID) 25 MCG tablet Take 25 mcg by mouth.     • nitroglycerin (NITROSTAT) 0.4 MG SL tablet 1 under the tongue as needed for angina, may repeat q5mins for up three doses 30 tablet 3   • ranolazine (RANEXA) 500 MG 12 hr tablet Take 1 tablet by mouth 2 (Two) Times a Day. 60 tablet 11   • sacubitril-valsartan (ENTRESTO) 24-26 MG tablet Take 1 tablet by mouth 2 (Two) Times a Day. 60 tablet 11     No current facility-administered medications for this visit.        ALLERGIES    Isosorbide    Past Medical History:   Diagnosis Date   • COPD (chronic obstructive pulmonary disease) (CMS/[facility])    • Gallbladder abscess    • Hypertension    • Hypothyroidism    • ADRI (obstructive sleep apnea)    • Syncope        Social History     Socioeconomic History   • Marital status:      Spouse name: Not on file   • Number of children: Not on file   • Years of education: Not on file   • Highest education level: Not on file   Occupational History   • Occupation: Contractor   Tobacco Use   • Smoking status: Current Every Day Smoker     Packs/day: 0.50     Types: Cigarettes     Start date: 5/10/1991   • Smokeless tobacco: Current User     Types: Chew   Substance and Sexual Activity   • Alcohol use: No   • Drug use: No   • Sexual activity: Defer       Family History   Problem Relation Age of Onset   • Heart disease Mother    • Valvular heart disease Mother    • Heart attack Father    • Heart failure Paternal Uncle        Review of Systems   Constitutional: Positive for fatigue (stays tired ). Negative for chills and fever.  "  HENT: Negative for congestion, rhinorrhea and sore throat.    Eyes: Positive for visual disturbance (reading glasses ).   Respiratory: Positive for apnea (Uses Cpap nightly ), chest tightness (Tightness and pressure ) and shortness of breath (SOA at rest and with exertion ).    Cardiovascular: Positive for chest pain (Pain in left side of chest; ranexa helped some, no nitro for some time ), palpitations (Occasional flutters ) and leg swelling (LE edema ).   Gastrointestinal: Negative for abdominal pain, blood in stool, nausea and vomiting.   Endocrine: Negative for cold intolerance and heat intolerance.   Genitourinary: Negative for dysuria, frequency, hematuria and urgency.   Musculoskeletal: Positive for arthralgias (joints ) and back pain (pain in entire back ).   Skin: Positive for wound (sore on lower right leg ). Negative for rash.   Allergic/Immunologic: Negative for environmental allergies and food allergies.   Neurological: Positive for light-headedness (off and on). Negative for dizziness and weakness.   Hematological: Bruises/bleeds easily (Bruises easily ).   Psychiatric/Behavioral: Positive for sleep disturbance (Denies waking with smothering, but doesn't sleep well ).       Objective   /91 (BP Location: Left arm, Patient Position: Sitting)   Pulse 90   Ht 162.6 cm (64\")   Wt 93.4 kg (205 lb 12.8 oz)   SpO2 99%   BMI 35.33 kg/m²   Vitals:    08/27/19 1255   BP: 142/91   BP Location: Left arm   Patient Position: Sitting   Pulse: 90   SpO2: 99%   Weight: 93.4 kg (205 lb 12.8 oz)   Height: 162.6 cm (64\")      Lab Results (most recent)     None        Physical Exam   Constitutional: He is oriented to person, place, and time. Vital signs are normal. He appears well-developed and well-nourished. He is active and cooperative.   HENT:   Head: Normocephalic.   Eyes: Lids are normal.   Neck: Normal carotid pulses, no hepatojugular reflux and no JVD present. Carotid bruit is not present. "   Cardiovascular: Normal rate, regular rhythm and normal heart sounds.   Pulses:       Radial pulses are 2+ on the right side, and 2+ on the left side.        Dorsalis pedis pulses are 2+ on the right side, and 2+ on the left side.        Posterior tibial pulses are 2+ on the right side, and 2+ on the left side.   No edema BLE.   Pulmonary/Chest: Effort normal and breath sounds normal.   Abdominal: Normal appearance and bowel sounds are normal.   Neurological: He is alert and oriented to person, place, and time.   Skin: Skin is warm, dry and intact.   Psychiatric: His speech is normal and behavior is normal. Judgment and thought content normal. His affect is angry. Cognition and memory are normal.       Procedure   Procedures         Assessment/Plan      Diagnosis Plan   1. Other chest pain     2. Coronary artery disease involving native coronary artery of native heart with angina pectoris (CMS/HCC)     3. Essential hypertension     4. Hyperlipidemia, unspecified hyperlipidemia type     5. Shortness of breath     6. Smokeless tobacco use     7. Smoking     8. Ischemic cardiomyopathy  sacubitril-valsartan (ENTRESTO) 24-26 MG tablet   9. Palpitations     10. Peripheral edema     11. Congestive heart failure, unspecified HF chronicity, unspecified heart failure type (CMS/HCC)         Return in about 3 months (around 11/27/2019).    Chest pain-patient is on Ranexa and intolerant to Imdur.  CAD-patient is on aspirin, Plavix, beta and statin.  He can use nitro PRN for chest pain no resolution he is to go to the ER.  Hypertension-patient has not taken medication yet today but he is on carvedilol and I am starting him on Entresto.  He was advised to hold his lisinopril from now on and start Entresto on Friday.  Hyperlipidemia-patient is on Lipitor.  Shortness of breath-stable.  Ischemic cardiomyopathy/CHF-patient's on beta-blocker and I am stopping his lisinopril which she has not taken today therefore he can start Entresto  on Friday.  Palpitations-stable.  He will continue his medication regimen otherwise.  He will follow-up in 3 months or sooner if any changes.  At that time we will repeat echocardiogram.       Nitin Solis is a current cigarettes user.  He currently smokes 0.75 packs of cigarettes per day for a duration of 20 years. I have educated him on the risk of diseases from using tobacco products such as cancer, COPD and heart diease.     I advised him to quit and he is not willing to quit.    Patient's Body mass index is 35.33 kg/m². BMI is above normal parameters. Recommendations include: educational material.      Electronically signed by:

## 2019-08-28 ENCOUNTER — PRIOR AUTHORIZATION (OUTPATIENT)
Dept: CARDIOLOGY | Facility: CLINIC | Age: 44
End: 2019-08-28

## 2019-08-28 PROBLEM — I50.9 CONGESTIVE HEART FAILURE: Status: ACTIVE | Noted: 2019-08-28

## 2019-09-13 ENCOUNTER — TELEPHONE (OUTPATIENT)
Dept: CARDIOLOGY | Facility: CLINIC | Age: 44
End: 2019-09-13

## 2019-09-13 NOTE — TELEPHONE ENCOUNTER
Pt's PCP called stating that pt contacted their office stating that pt had a bruise in the groin area and a knot since [facility].     Chana came out to take over call. She stated that pt should go to ER for imaging, as our imaging downstairs is closed.

## 2019-10-02 ENCOUNTER — TELEPHONE (OUTPATIENT)
Dept: CARDIOLOGY | Facility: CLINIC | Age: 44
End: 2019-10-02

## 2019-10-02 NOTE — TELEPHONE ENCOUNTER
Call to advise me of Entresto samples however no answer or voicemail.  Per message voicemail has not been set up yet.

## 2019-12-16 ENCOUNTER — TELEPHONE (OUTPATIENT)
Dept: CARDIOLOGY | Facility: CLINIC | Age: 44
End: 2019-12-16

## 2022-05-08 ENCOUNTER — HOSPITAL ENCOUNTER (EMERGENCY)
Facility: HOSPITAL | Age: 47
Discharge: HOME | End: 2022-05-08
Attending: EMERGENCY MEDICINE | Admitting: EMERGENCY MEDICINE

## 2022-05-08 ENCOUNTER — APPOINTMENT (OUTPATIENT)
Dept: CT IMAGING | Facility: HOSPITAL | Age: 47
End: 2022-05-08

## 2022-05-08 VITALS
HEIGHT: 64 IN | TEMPERATURE: 98 F | BODY MASS INDEX: 35.85 KG/M2 | DIASTOLIC BLOOD PRESSURE: 95 MMHG | RESPIRATION RATE: 18 BRPM | HEART RATE: 94 BPM | WEIGHT: 210 LBS | SYSTOLIC BLOOD PRESSURE: 149 MMHG | OXYGEN SATURATION: 97 %

## 2022-05-08 DIAGNOSIS — M54.42 CHRONIC LEFT-SIDED LOW BACK PAIN WITH LEFT-SIDED SCIATICA: Primary | ICD-10-CM

## 2022-05-08 DIAGNOSIS — G89.29 CHRONIC LEFT-SIDED LOW BACK PAIN WITH LEFT-SIDED SCIATICA: Primary | ICD-10-CM

## 2022-05-08 PROCEDURE — 99283 EMERGENCY DEPT VISIT LOW MDM: CPT

## 2022-05-08 PROCEDURE — 25010000002 ORPHENADRINE CITRATE PER 60 MG: Performed by: PHYSICIAN ASSISTANT

## 2022-05-08 PROCEDURE — 96372 THER/PROPH/DIAG INJ SC/IM: CPT

## 2022-05-08 PROCEDURE — 25010000002 METHYLPREDNISOLONE PER 80 MG: Performed by: PHYSICIAN ASSISTANT

## 2022-05-08 PROCEDURE — 25010000002 KETOROLAC TROMETHAMINE PER 15 MG: Performed by: PHYSICIAN ASSISTANT

## 2022-05-08 PROCEDURE — 72131 CT LUMBAR SPINE W/O DYE: CPT

## 2022-05-08 RX ORDER — KETOROLAC TROMETHAMINE 30 MG/ML
60 INJECTION, SOLUTION INTRAMUSCULAR; INTRAVENOUS ONCE
Status: COMPLETED | OUTPATIENT
Start: 2022-05-08 | End: 2022-05-08

## 2022-05-08 RX ORDER — METHYLPREDNISOLONE 4 MG/1
TABLET ORAL
Qty: 21 TABLET | Refills: 0 | Status: SHIPPED | OUTPATIENT
Start: 2022-05-08

## 2022-05-08 RX ORDER — IBUPROFEN 800 MG/1
800 TABLET ORAL EVERY 6 HOURS PRN
Qty: 30 TABLET | Refills: 0 | Status: SHIPPED | OUTPATIENT
Start: 2022-05-08

## 2022-05-08 RX ORDER — METHYLPREDNISOLONE ACETATE 80 MG/ML
120 INJECTION, SUSPENSION INTRA-ARTICULAR; INTRALESIONAL; INTRAMUSCULAR; SOFT TISSUE ONCE
Status: COMPLETED | OUTPATIENT
Start: 2022-05-08 | End: 2022-05-08

## 2022-05-08 RX ORDER — ORPHENADRINE CITRATE 100 MG/1
100 TABLET, EXTENDED RELEASE ORAL 2 TIMES DAILY
Qty: 12 TABLET | Refills: 0 | Status: SHIPPED | OUTPATIENT
Start: 2022-05-08

## 2022-05-08 RX ORDER — ORPHENADRINE CITRATE 30 MG/ML
60 INJECTION INTRAMUSCULAR; INTRAVENOUS ONCE
Status: COMPLETED | OUTPATIENT
Start: 2022-05-08 | End: 2022-05-08

## 2022-05-08 RX ADMIN — ORPHENADRINE CITRATE 60 MG: 30 INJECTION INTRAMUSCULAR; INTRAVENOUS at 22:28

## 2022-05-08 RX ADMIN — METHYLPREDNISOLONE ACETATE 120 MG: 80 INJECTION, SUSPENSION INTRA-ARTICULAR; INTRALESIONAL; INTRAMUSCULAR; SOFT TISSUE at 22:22

## 2022-05-08 RX ADMIN — KETOROLAC TROMETHAMINE 60 MG: 60 INJECTION, SOLUTION INTRAMUSCULAR at 22:24

## 2022-05-09 NOTE — ED PROVIDER NOTES
Subjective   45 yo male patient presents to the ED with complaints of left side low back pain that radiates down posterior left upper leg. Patient denies any numbness, tingling, or incontinence. Patient states that he lifts a lot at work. He has hx of chronic back pain. Pt also has hx of COPD, HTN, ADRI, hypothyroidism.       History provided by:  Patient   used: No        Review of Systems   Constitutional: Negative.    HENT: Negative.    Eyes: Negative.    Respiratory: Negative.    Cardiovascular: Negative.    Gastrointestinal: Negative.    Endocrine: Negative.    Genitourinary: Negative.    Musculoskeletal: Positive for back pain.   Skin: Negative.    Allergic/Immunologic: Negative.    Neurological: Negative.    Hematological: Negative.    Psychiatric/Behavioral: Negative.    All other systems reviewed and are negative.      Past Medical History:   Diagnosis Date   • COPD (chronic obstructive pulmonary disease) (CMS/HCC)    • Gallbladder abscess    • Hypertension    • Hypothyroidism    • ADRI (obstructive sleep apnea)    • Syncope        Allergies   Allergen Reactions   • Isosorbide Other (See Comments)     Side affect of Headache        Past Surgical History:   Procedure Laterality Date   • CARDIAC CATHETERIZATION  08/14/2019    No stents    • CHOLECYSTECTOMY  2015   • CORONARY ANGIOPLASTY WITH STENT PLACEMENT         Family History   Problem Relation Age of Onset   • Heart disease Mother    • Valvular heart disease Mother    • Heart attack Father    • Heart failure Paternal Uncle        Social History     Socioeconomic History   • Marital status:    Tobacco Use   • Smoking status: Current Every Day Smoker     Packs/day: 0.50     Types: Cigarettes     Start date: 5/10/1991   • Smokeless tobacco: Current User     Types: Chew   Substance and Sexual Activity   • Alcohol use: No   • Drug use: No   • Sexual activity: Defer           Objective   Physical Exam  Vitals and nursing note reviewed.    Constitutional:       Appearance: Normal appearance. He is normal weight.   HENT:      Head: Normocephalic and atraumatic.      Right Ear: External ear normal.      Left Ear: External ear normal.      Nose: Nose normal.      Mouth/Throat:      Mouth: Mucous membranes are moist.      Pharynx: Oropharynx is clear.   Eyes:      Extraocular Movements: Extraocular movements intact.      Conjunctiva/sclera: Conjunctivae normal.      Pupils: Pupils are equal, round, and reactive to light.   Cardiovascular:      Rate and Rhythm: Normal rate and regular rhythm.      Pulses: Normal pulses.      Heart sounds: Normal heart sounds.   Pulmonary:      Effort: Pulmonary effort is normal.      Breath sounds: Normal breath sounds.   Abdominal:      General: Abdomen is flat. Bowel sounds are normal.      Palpations: Abdomen is soft.   Musculoskeletal:         General: Tenderness present.      Cervical back: Normal range of motion and neck supple.      Lumbar back: Tenderness present. No bony tenderness. Negative right straight leg raise test and negative left straight leg raise test.        Back:       Comments: N/V intact. Pt is ambulatory.    Skin:     General: Skin is warm and dry.      Capillary Refill: Capillary refill takes less than 2 seconds.   Neurological:      General: No focal deficit present.      Mental Status: He is alert and oriented to person, place, and time. Mental status is at baseline.   Psychiatric:         Mood and Affect: Mood normal.         Behavior: Behavior normal.         Thought Content: Thought content normal.         Judgment: Judgment normal.         Procedures           ED Course  ED Course as of 05/08/22 2257   Sun May 08, 2022   2250 CT Lumbar Spine Without Contrast  IMPRESSION:  1.  No clearly acute radiographic findings.     2.  At L5-S1, focal left-sided disc osteophyte complex with displacement of the left S1 nerve root and moderate compromise of the left neural foramen.     3.  There is mild  anterior wedging of T12 and L1 with Schmorl's nodes demonstrated. These findings can be seen in Scheuermann's disease. Please correlate clinically.     4.  Clinical aspects of the case will determine if MR of the lumbar spine could provide additional information.     Signer Name: Velasquez Foster MD   Signed: 5/8/2022 10:46 PM   Workstation Name: [id number]    [facility]          Specimen Collected: 05/08/22 22:46 Last Resulted: 05/08/22 22:46         [ML]   2256 Discussed with Aissatou  [ML]   2257 PT feeling better. Instructions to f/u with PCP and Dr. Hinojosa. Discussed sx and red flags that would warrant return to the ED>  [ML]      ED Course User Index  [ML] Verito Cronin PA                                                 MDM  Number of Diagnoses or Management Options     Amount and/or Complexity of Data Reviewed  Tests in the radiology section of CPT®: reviewed and ordered  Discuss the patient with other providers: yes    Risk of Complications, Morbidity, and/or Mortality  Presenting problems: low  Diagnostic procedures: low  Management options: low    Patient Progress  Patient progress: improved      Final diagnoses:   Chronic left-sided low back pain with left-sided sciatica       ED Disposition  ED Disposition     ED Disposition   Discharge    Condition   Stable    Comment   --             Gertrude Walton  [address]  [place] [address] [phone]    Schedule an appointment as soon as possible for a visit in 1 day      Dexter Hinojosa DO  [address] [place] [address] [phone]    Schedule an appointment as soon as possible for a visit in 1 day           Medication List      New Prescriptions    ibuprofen 800 MG tablet  Commonly known as: ADVIL,MOTRIN  Take 1 tablet by mouth Every 6 (Six) Hours As Needed for Moderate Pain .     methylPREDNISolone 4 MG dose pack  Commonly known as: MEDROL  Take as directed on package instructions.     orphenadrine  100 MG 12 hr tablet  Commonly known as: NORFLEX  Take 1 tablet by mouth 2 (Two) Times a Day.           Where to Get Your Medications      These medications were sent to [facility] - [place], [place] - [address] - [phone]  - [phone]   [address] [place] [address], [facility] [address]    Phone: [phone]   · ibuprofen 800 MG tablet  · methylPREDNISolone 4 MG dose pack  · orphenadrine 100 MG 12 hr tablet          Verito Cronin PA  05/08/22 8854

## 2023-01-19 ENCOUNTER — HOSPITAL ENCOUNTER (OUTPATIENT)
Dept: CARDIOLOGY | Facility: HOSPITAL | Age: 48
Discharge: HOME | End: 2023-01-19

## 2023-01-19 DIAGNOSIS — Z00.6 EXAMINATION FOR NORMAL COMPARISON OR CONTROL IN CLINICAL RESEARCH: ICD-10-CM

## 2023-01-19 PROBLEM — R94.39 ABNORMAL STRESS TEST: Status: RESOLVED | Noted: 2019-08-14 | Resolved: 2023-01-19

## 2023-01-19 PROBLEM — R55 SYNCOPE: Status: RESOLVED | Noted: 2019-08-06 | Resolved: 2023-01-19

## 2023-01-19 PROBLEM — I50.9 CONGESTIVE HEART FAILURE (HCC): Status: RESOLVED | Noted: 2019-08-28 | Resolved: 2023-01-19

## 2023-01-19 PROBLEM — R60.9 PERIPHERAL EDEMA: Status: RESOLVED | Noted: 2019-08-27 | Resolved: 2023-01-19

## 2023-01-19 PROBLEM — I42.0 DILATED CARDIOMYOPATHY (HCC): Status: RESOLVED | Noted: 2019-08-27 | Resolved: 2023-01-19

## 2023-01-19 PROBLEM — R06.02 SHORTNESS OF BREATH: Status: RESOLVED | Noted: 2017-04-14 | Resolved: 2023-01-19

## 2023-01-19 PROBLEM — R07.9 CHEST PAIN: Status: RESOLVED | Noted: 2017-04-14 | Resolved: 2023-01-19
